# Patient Record
Sex: FEMALE
[De-identification: names, ages, dates, MRNs, and addresses within clinical notes are randomized per-mention and may not be internally consistent; named-entity substitution may affect disease eponyms.]

---

## 2023-01-23 ENCOUNTER — HOSPITAL ENCOUNTER (EMERGENCY)
Dept: HOSPITAL 97 - ER | Age: 23
Discharge: HOME | End: 2023-01-23
Payer: COMMERCIAL

## 2023-01-23 VITALS — OXYGEN SATURATION: 98 % | SYSTOLIC BLOOD PRESSURE: 121 MMHG | DIASTOLIC BLOOD PRESSURE: 62 MMHG

## 2023-01-23 VITALS — TEMPERATURE: 97.9 F

## 2023-01-23 DIAGNOSIS — L72.9: Primary | ICD-10-CM

## 2023-01-23 PROCEDURE — 10060 I&D ABSCESS SIMPLE/SINGLE: CPT

## 2023-01-23 PROCEDURE — 0H96XZZ DRAINAGE OF BACK SKIN, EXTERNAL APPROACH: ICD-10-PCS

## 2023-01-23 PROCEDURE — 99283 EMERGENCY DEPT VISIT LOW MDM: CPT

## 2023-01-23 NOTE — EDPHYS
Physician Documentation                                                                           

 [facility]                                                                 

Name: Tram Holden                                                                            

Age: 22 yrs                                                                                       

Sex: Female                                                                                       

: 2000                                                                                   

MRN: [id number]                                                                                   

Arrival Date: 2023                                                                          

Time: 14:52                                                                                       

Account#: [id number]                                                                            

Bed 17                                                                                            

Private MD:                                                                                       

ED Physician Wayne Lomas                                                                         

HPI:                                                                                              

                                                                                             

15:42 This 22 yrs old  Female presents to ER via Ambulatory with complaints of Bump   cp  

      On Back.                                                                                    

15:43 cyst. Description: fluctuant. Onset: The symptoms/episode began/occurred several months cp  

      ago. Associated signs and symptoms: Pertinent positives: swelling, pain, Pertinent          

      negatives: discharge, drainage, fever.                                                      

                                                                                                  

OB/GYN:                                                                                           

17:33 LMP 2023                                                                           [facility] 

                                                                                                  

Historical:                                                                                       

- Allergies:                                                                                      

15:33 No Known Allergies;                                                                     ss  

- Home Meds:                                                                                      

15:33 None [Active];                                                                          ss  

- PMHx:                                                                                           

15:33 None;                                                                                   ss  

- PSHx:                                                                                           

15:33 Tonsillectomy;  section; Cholecystectomy;                                       ss  

                                                                                                  

- Immunization history:: Client reports receiving the 2nd dose of the Covid vaccine.              

- Social history:: Smoking status: Patient denies any tobacco usage or history of.                

                                                                                                  

                                                                                                  

ROS:                                                                                              

15:44 Constitutional: Negative for body aches, chills, fever.                                 cp  

15:44 Cardiovascular: Negative for chest pain, palpitations.                                      

15:44 Respiratory: Negative for cough, shortness of breath, wheezing.                             

15:44 Abdomen/GI: Negative for abdominal pain, nausea, vomiting, and diarrhea.                    

15:44 Skin: Positive for of the upper mid back, cyst.                                             

15:44 All other systems are negative.                                                             

                                                                                                  

Exam:                                                                                             

15:44 Head/Face:  Normocephalic, atraumatic.                                                  cp  

15:44 Constitutional: The patient appears in no acute distress, alert, awake, non-toxic, well     

      developed, well nourished.                                                                  

15:44 Cardiovascular: Rate: normal.                                                               

15:44 Respiratory: the patient does not display signs of respiratory distress,  Respirations:     

      normal, no use of accessory muscles, no retractions, labored breathing, is not present.     

15:44 Skin: cyst noted upper mid back that appears w/o erythema, drainage, mildly tender to       

      palpation.                                                                                  

                                                                                                  

Vital Signs:                                                                                      

15:30  / 57; Pulse 71; Resp 15; Temp 97.9(TE); Pulse Ox 100% on R/A; Weight 65.32 kg;   ss  

      Height 5 ft. 5 in. (165.10 cm); Pain 6/10;                                                  

17:12  / 62; Pulse 80; Resp 16; Pulse Ox 98% ;                                          jh5 

15:30 Body Mass Index 23.96 (65.32 kg, 165.10 cm)                                             ss  

                                                                                                  

Procedures:                                                                                       

17:12 I \T\ D: Incision and drainage was performed for an abscess of the mid upper back Prepped cp

      with Betadine, Anesthetized with 5 ccs mixture 1% lidocaine w/o epi and 0.5% marcaine.      

      Incised with #11 blade. Drained thick white colored Packed with iodoform gauze,             

      Dressing: sterile 4x4 gauze, the patient tolerated the procedure well.                      

                                                                                                  

MDM:                                                                                              

16:05 Patient medically screened.                                                             cp  

16:45 Differential diagnosis: abscess, cellulitis, cyst.                                      cp  

17:15 Data reviewed: vital signs, nurses notes.                                               cp  

17:15 Consideration of Admission/Observation Escalation of care including                     cp  

      admission/observation considered. Test considered but Not performed: Other Details US.      

      Counseling: I had a detailed discussion with the patient and/or guardian regarding: the     

      historical points, exam findings, and any diagnostic results supporting the                 

      discharge/admit diagnosis, to return to the emergency department if symptoms worsen or      

      persist or if there are any questions or concerns that arise at home. Response to           

      treatment: the patient's symptoms have markedly improved after treatment, and as a          

      result, I will discharge patient.                                                           

                                                                                                  

                                                                                             

16:06 Order name: I\T\D Setup; Complete Time: 16:38                                             cp

                                                                                                  

Administered Medications:                                                                         

16:59 Drug: Lidocaine (1 %) 5 ml {Note: Given via Cristian Leo PA.} Volume: 5 ml; Route:        jh5 

      Infiltration;                                                                               

16:59 Drug: Marcaine (bupivacaine) (0.5 %) 5 ml {Note: Given Via Cristian Leo, PA.} Volume: 10   jh5 

      ml; Route: Infiltration;                                                                    

                                                                                                  

                                                                                                  

Disposition:                                                                                      

19:21 Co-signature as Attending Physician, Wayne ROUSSEAU was immediately available on-site ms3 

      in the Emergency Department for consultation in the care of the patient.                    

                                                                                                  

Disposition Summary:                                                                              

23 17:15                                                                                    

Discharge Ordered                                                                                 

      Location: Home                                                                          cp  

      Problem: new                                                                            cp  

      Symptoms: have improved                                                                 cp  

      Condition: Stable                                                                       cp  

      Diagnosis                                                                                   

        - Epidermal cyst - upper mid back                                                     cp  

      Followup:                                                                               cp  

        - With: Vimal Mcqueen MD                                                                

        - When: 1 - 2 days                                                                         

        - Reason: Wound Recheck                                                                    

      Discharge Instructions:                                                                     

        - Discharge Summary Sheet                                                             cp  

        - Epidermal Cyst Removal                                                              cp  

        - Epidermal Cyst                                                                      cp  

        - Epidermal Cyst Removal, Care After                                                  cp  

      Forms:                                                                                      

        - Medication Reconciliation Form                                                      cp  

        - Thank You Letter                                                                    cp  

        - Antibiotic Education                                                                cp  

        - Prescription Opioid Use                                                             cp  

        - Work release form                                                                   jh5 

      Prescriptions:                                                                              

        - Ibuprofen 800 mg Oral Tablet                                                             

            - take 1 tablet by ORAL route every 8 hours As needed take with food; 30 tablet;  cp  

      Refills: 0, Product Selection Permitted                                                     

Signatures:                                                                                       

Myrna Naranjo, RN                      RN   ss                                                   

Francesco Leo PA                         PA   Wayne Guerin DO DO   ms3                                                  

Cinthia Jarquin, RN                       RN   jh5                                                  

                                                                                                  

**************************************************************************************************

## 2023-01-23 NOTE — ER
Nurse's Notes                                                                                     

 [facility]                                                                 

Name: Tram Holden                                                                            

Age: 22 yrs                                                                                       

Sex: Female                                                                                       

: 2000                                                                                   

MRN: [id number]                                                                                   

Arrival Date: 2023                                                                          

Time: 14:52                                                                                       

Account#: [id number]                                                                            

Bed 17                                                                                            

Private MD:                                                                                       

Diagnosis: Epidermal cyst-upper mid back                                                          

                                                                                                  

Presentation:                                                                                     

                                                                                             

15:30 Chief complaint: Patient states: bump to middle upper back that was noticed 4 weeks ago ss  

      and seems to be getting bigger. Coronavirus screen: Client denies travel out of the         

      [place] in the last 14 days. Ebola Screen: Patient denies exposure to infectious person.       

      Patient denies travel to an Ebola-affected area in the 21 days before illness onset.        

      Initial Sepsis Screen: Does the patient meet any 2 criteria? No. Patient's initial          

      sepsis screen is negative. Does the patient have a suspected source of infection? No.       

      Patient's initial sepsis screen is negative. Risk Assessment: Do you want to hurt           

      yourself or someone else? Patient reports no desire to harm self or others. Onset of        

      symptoms was 2022.                                                                 

15:30 Method Of Arrival: Ambulatory                                                           ss  

15:30 Acuity: IVAN 4                                                                           ss  

                                                                                                  

Triage Assessment:                                                                                

17:33 General: Appears in no apparent distress. Behavior is calm, cooperative. Pain: Denies   [facility] 

      pain.                                                                                       

                                                                                                  

OB/GYN:                                                                                           

17:33 LMP 2023                                                                           [facility] 

                                                                                                  

Historical:                                                                                       

- Allergies:                                                                                      

15:33 No Known Allergies;                                                                     ss  

- Home Meds:                                                                                      

15:33 None [Active];                                                                          ss  

- PMHx:                                                                                           

15:33 None;                                                                                   ss  

- PSHx:                                                                                           

15:33 Tonsillectomy;  section; Cholecystectomy;                                       ss  

                                                                                                  

- Immunization history:: Client reports receiving the 2nd dose of the Covid vaccine.              

- Social history:: Smoking status: Patient denies any tobacco usage or history of.                

                                                                                                  

                                                                                                  

Screenin:12 [facility] ED Fall Risk Assessment (Adult) History of falling in the last 3 months,       [facility] 

      including since admission No falls in past 3 months (0 pts) Confusion or Disorientation     

      No (0 pts) Intoxicated or Sedated No (0 pts) Impaired Gait No (0 pts) Mobility Assist       

      Device Used No (0 pt) Altered Elimination No (0 pt) Score/Fall Risk Level 0 - 2 = Low       

      Risk. Abuse screen: Denies threats or abuse. Denies injuries from another. Nutritional      

      screening: No deficits noted. Tuberculosis screening: No symptoms or risk factors           

      identified.                                                                                 

                                                                                                  

Vital Signs:                                                                                      

15:30  / 57; Pulse 71; Resp 15; Temp 97.9(TE); Pulse Ox 100% on R/A; Weight 65.32 kg;     

      Height 5 ft. 5 in. (165.10 cm); Pain 6/10;                                                  

17:12  / 62; Pulse 80; Resp 16; Pulse Ox 98% ;                                          jh5 

15:30 Body Mass Index 23.96 (65.32 kg, 165.10 cm)                                               

                                                                                                  

ED Course:                                                                                        

14:52 Patient arrived in ED.                                                                  rg4 

14:58 Francesco Leo PA is PHCP.                                                                cp  

14:58 Wayne Lomas DO is Attending Physician.                                                cp  

15:29 Francesco Leo PA is PHCP.                                                                cp  

15:33 Triage completed.                                                                       ss  

15:33 Arm band placed on right wrist.                                                           

17:14 Vimal Mcqueen MD is Referral Physician.                                              cp  

17:33 Patient has correct armband on for positive identification. Bed in low position. Call   5 

      light in reach. Side rails up X 1.                                                          

17:33 No provider procedures requiring assistance completed. Patient did not have IV access   jh5 

      during this emergency room visit.                                                           

                                                                                                  

Administered Medications:                                                                         

16:59 Drug: Lidocaine (1 %) 5 ml {Note: Given via ART Stratton} Volume: 5 ml; Route:        jh5 

      Infiltration;                                                                               

16:59 Drug: Marcaine (bupivacaine) (0.5 %) 5 ml {Note: Given Via ART [facility]} Volume: 10   jh5 

      ml; Route: Infiltration;                                                                    

                                                                                                  

                                                                                                  

Medication:                                                                                       

17:33 VIS not applicable for this client.                                                     5 

                                                                                                  

Outcome:                                                                                          

17:15 Discharge ordered by MD.                                                                cp  

17:34 Discharged to home ambulatory.                                                          5 

17:34 Condition: good                                                                             

17:34 Discharge instructions given to patient, Instructed on discharge instructions, follow       

      up and referral plans. medication usage, safety practices, Demonstrated understanding       

      of instructions, follow-up care, medications, Prescriptions given X 1.                      

17:34 Patient left the ED.                                                                    5 

                                                                                                  

Signatures:                                                                                       

Myrna Naranjo RN                      RN                                                      

Francesco Leo PA PA cp Garcia, Rubi                                 rg4                                                  

Cinthai Jarquin RN                       RN   5                                                  

                                                                                                  

**************************************************************************************************

## 2023-01-23 NOTE — XMS REPORT
Continuity of Care Document

                           Created on:2023



Patient:CHINA BARRY

Sex:Female

:2000

External Reference #:[id number]





Demographics







                          Address                   [address]



                                                    [place] [address]

 

                          Home Phone                [phone]

 

                          Work Phone                [phone]

 

                          Mobile Phone              [phone]

 

                          Email Address             [email or web address]

 

                          Preferred Language        English

 

                          Marital Status            Unknown

 

                          Baptism Affiliation     Unknown

 

                          Race                      Unknown

 

                          Additional Race(s)        Unavailable



                                                    Unavailable

 

                          Ethnic Group              Unknown









Author







                          Organization              [facility]

t

 

                          Address                   [address]



                                                    [place] [address]

 

                          Phone                     [phone]









Support







                Name            Relationship    Address         Phone

 

                NALDO YADAV Significant     [address]        [phone]



                                                [place] [address] 

 

                NALDO YADAV Unavailable     [address]        [phone]



                                                [place] [address] 

 

                JHONNYKVNG               [address]     [phone]



                                                [place] [address] 

 

                RUI BARRY CLARK MCKEON               [address]     [place]

[place] [phone] 

 

                Rui Zayas          [address]     [phone]





                                                [place] [address] 









Care Team Providers







                    Name                Role                Phone

 

                    PCP, PATIENT DOES NOT HAVE A Primary Care Physician [place]

Mouna Olivarez     Attending Clinician Unavailable

 

                    Mireille Jackson MD     Attending Clinician [phone]

 

                    Unknown, Attending  Attending Clinician Unavailable

 

                    MIREILLE JACKSON        Attending Clinician Unavailable

 

                    Lucero Ryan  Attending Clinician [phone]

 

                    Provider, [facility] Attending Clinician Unavailable

 

                    Doctor Unassigned, No Name Attending Clinician Unavailable

 

                    Jaquan Land Attending Clinician [phone]

 

                    JAYNE Amaro Attending Clinician [phone]

 

                    JAYNE ROSS     Attending Clinician Unavailable

 

                    Nancy Gomez MD Attending Clinician [phone]

 

                    MATTHEW GOSS     Attending Clinician Unavailable

 

                    Emma Holt Attending Clinician [phone]

 

                    Pcp, Patient Does Not Have A Attending Clinician [phone]

[phone]

 

                    Mouna Muse     Admitting Clinician Unavailable

 

                    JAYNE ROSS     Admitting Clinician Unavailable

 

                    MATTHEW GOSS     Admitting Clinician Unavailable









Payers







           Payer Name Policy Type Policy Number Effective Date Expiration Date LIAM augustine

 

           [facility]            [id number] 2016 00:00:00            







Problems







       Condition Condition Condition Status Onset  Resolution Last   Treating Co

mments 

Source



       Name   Details Category        Date   Date   Treatment Clinician        



                                                 Date                 

 

       Flexural Flexural Disease Active                              Unive

rs



       eczema eczema               9-30                               ity of



                                   00:00:                             [place]



                                   00                                 Medical



                                                                      Branch

 

       Anxiety Anxiety Disease Active                              [facility]



                                   9-30                               ity of



                                   00:00:                             [place]



                                   00                                 Medical



                                                                      Branch

 

       Positive Positive Disease Active                              Unive

rs



       JULIAN    JULIAN                  2-20                               ity of



       (antinucle (antinucle               00:00:                             [facility]

[place]



       ar     ar                   00                                 Medical



       antibody) antibody)                                                  [facility]

ch

 

       H. pylori H. pylori Disease Active                       Overview: 

Univers



       infection infection               1-18                        Added  ity 

of



                                   00:00:                      automatic [place]



                                   00                          ally from Medical



                                                               request Branch



                                                               for    



                                                               surgery 



                                                               [id number] 

 

       Gastritis Gastritis Disease Active                       Overview: 

Univers



       without without               1-18                        Added  ity of



       bleeding, bleeding,               00:00:                      automatic [facility]

[place]



       unspecifie unspecifie               00                          ally from

 Medical



       d      d                                                request Branch



       chronicity chronicity                                           for    



       ,      ,                                                surgery 



       unspecifie unspecifie                                           [id number] 



       d      d                                                       



       gastritis gastritis                                                  



       type   type                                                    

 

       Gastritis, Gastritis, Disease Active                              U

nivers



       presence presence               1-08                               ity of



       of     of                   00:00:                             [place]



       bleeding bleeding               00                                 Medica

l



       unspecifie unspecifie                                                  Br

anch



       d,     d,                                                      



       unspecifie unspecifie                                                  



       d      d                                                       



       chronicity chronicity                                                  



       ,      ,                                                       



       unspecifie unspecifie                                                  



       d      d                                                       



       gastritis gastritis                                                  



       type   type                                                    

 

       Post-jasmina Post-jasmina Disease Active                              U

nivers



       cystectomy cystectomy               1-08                               it

y of



       syndrome syndrome               00:00:                             [place]



                                   00                                 Medical



                                                                      Branch

 

       Epigastric Epigastric Disease Active                              U

nivers



       abdominal abdominal               7-29                               ity 

of



       pain   pain                 00:00:                             [place]



                                   00                                 Medical



                                                                      Branch

 

       Migraines Migraines Disease Active                                    Uni

vers



                                                                      ity of



                                                                      [place]



                                                                      Medical



                                                                      Branch







Allergies, Adverse Reactions, Alerts







       Allergy Allergy Status Severity Reaction(s) Onset  Inactive Treating Comm

ents 

Source



       Name   Type                        Date   Date   Clinician        

 

       No Known DA     Active U                                   [facility]



       Allergie                             8                        [facility]



       [facility]                                  [facility]:                      [facility]



                                          00                          l of



                                                                      [place]

 

       No Known DA     Active U                                   [facility]



       Allergie                             8-31                        [facility]



       s                                  00:00:                      [facility]



                                          00                          l of



                                                                      [place]

 

       NO KNOWN Drug   Active                                           [facility]



       ALLERGIE Class                                                   ity of



       S                                                              [facility]







Social History







           Social Habit Start Date Stop Date  Quantity   Comments   Source

 

           ASSERTION  2021            Pregnant              University 



                      00:00:00                                    [facility]

 

           Exposure to 2022-11-10 2022 Not sure              [facility]-CoV-2 00:00:00   15:27:00                         [facility]



           (event)                                                [place]

 

           Tobacco use and 2022 Smokeless tobacco            Un

iversity of



           exposure   00:00:00   00:00:00   non-user              [facility]

 

           Alcohol intake 2022 Ex-drinker            [facility]



                      00:00:00   00:00:00   (finding)             [facility]

 

           Alcohol Comment 2017 Socially              Universit

y of



                      00:00:00   00:00:00                         [facility]

 

           Sex Assigned At [birth year]                       Universit

y of



           Birth      00:00:00   00:00:00                         [facility]









                Smoking Status  Start Date      Stop Date       Source

 

                Never smoked tobacco                                 [facility]







Medications







       Ordered Filled Start  Stop   Current Ordering Indication Dosage Frequency

 Signature

                    Comments            Components          Source



     Medication Medication Date Date Medication? Clinician                (SIG) 

          



     Name Name                                                   

 

     neomycin-po      2022      Yes       [id number] 4[drp]      Place 4        

   Univers



     lymyxin-hyd      1-20                               Drops in           ity 

of



     rocortisone      00:00:                               right ear           T

[place]



     otic      00                                 4 (four)           Medical



     solution                                         times           [place]



                                                  daily.           

 

     loratadine      2021- No        [id number] 10mg      Take 1           

Univers



     (CLARITIN)      6-14 07-15                          tablet by           ity

 of



     10 mg      00:00: 04:59                          mouth           [place]



     tablet      00   :00                           daily for           Medical



                                                  30 days.           Branch

 

     loratadine      2021- No        [id number] 10mg      Take 1           

Univers



     (CLARITIN)      6-14 07-15                          tablet by           ity

 of



     10 mg      00:00: 04:59                          mouth           [place]



     tablet      00   :00                           daily for           Medical



                                                  30 days.           Branch

 

     acetaminoph      2021- No             1000mg      1,000 mg,         

  Univers



     en        09 05-09                          Oral,           ity of



     (TYLENOL)      04:15: 03:21                          ONCE, 1           [place]

s



     tablet      00   :00                           dose, Sat           Medical



     1,000 mg                                         21 at           Branch



                                                  2315,           



                                                  Routine           

 

     cyclobenzap      2020-0      Yes       [id number] 10mg      Take 1          

 [facility]



     rine 10 mg      6-08                               tablet by           ity 

of



     tablet      00:00:                               mouth 3           [place]



               00                                 (three)           Medical



                                                  times           Branch



                                                  daily.           

 

     ibuprofen      2020-0      Yes       [id number] 600mg      Take 1           

Univers



     600 mg      6-08                               tablet by           ity of



     tablet      00:00:                               mouth           [place]



               00                                 every 6           Medical



                                                  (six)           Branch



                                                  hours as           



                                                  needed for           



                                                  Pain           



                                                  (scale           



                                                  4-6).           

 

     cyclobenzap      2020-0      Yes       [id number] 10mg      Take 1          

 Univers



     rine 10 mg      6-08                               tablet by           ity 

of



     tablet      00:00:                               mouth 3           [place]



               00                                 (three)           Medical



                                                  times           Branch



                                                  daily.           

 

     ibuprofen      2020-0      Yes       [id number] 600mg      Take 1           

Univers



     600 mg      6-08                               tablet by           ity of



     tablet      00:00:                               mouth           [place]



               00                                 every 6           Medical



                                                  (six)           Branch



                                                  hours as           



                                                  needed for           



                                                  Pain           



                                                  (scale           



                                                  4-6).           

 

     cyclobenzap      2020-0      Yes       [id number] 10mg      Take 1          

 Univers



     rine 10 mg      6-08                               tablet by           ity 

of



     tablet      00:00:                               mouth 3           [place]



               00                                 (three)           Medical



                                                  times           Branch



                                                  daily.           

 

     ibuprofen      2020-0      Yes       [id number] 600mg      Take 1           

Univers



     600 mg      6-08                               tablet by           ity of



     tablet      00:00:                               mouth           [place]



               00                                 every 6           Medical



                                                  (six)           Branch



                                                  hours as           



                                                  needed for           



                                                  Pain           



                                                  (scale           



                                                  4-6).           

 

     cyclobenzap      2020-0      Yes       [id number] 10mg      Take 1          

 Univers



     rine 10 mg      6-08                               tablet by           ity 

of



     tablet      00:00:                               mouth 3           [place]



               00                                 (three)           Medical



                                                  times           Branch



                                                  daily.           

 

     ibuprofen      2020-0      Yes       [id number] 600mg      Take 1           

Univers



     600 mg      6-08                               tablet by           ity of



     tablet      00:00:                               mouth           [place]



               00                                 every 6           Medical



                                                  (six)           Branch



                                                  hours as           



                                                  needed for           



                                                  Pain           



                                                  (scale           



                                                  4-6).           

 

     cyclobenzap      2020-0      Yes       [id number] 10mg      Take 1          

 Univers



     rine 10 mg      6-08                               tablet by           ity 

of



     tablet      00:00:                               mouth 3           [place]



               00                                 (three)           Medical



                                                  times           Branch



                                                  daily.           

 

     ibuprofen      2020-0      Yes       [id number] 600mg      Take 1           

Univers



     600 mg      6-08                               tablet by           ity of



     tablet      00:00:                               mouth           [place]



               00                                 every 6           Medical



                                                  (six)           Branch



                                                  hours as           



                                                  needed for           



                                                  Pain           



                                                  (scale           



                                                  4-6).           

 

     cyclobenzap      2020-0      Yes       [id number] 10mg      Take 1          

 Univers



     rine 10 mg      6-08                               tablet by           ity 

of



     tablet      00:00:                               mouth 3           [place]



               00                                 (three)           Medical



                                                  times           Branch



                                                  daily.           

 

     ibuprofen      2020-0      Yes       [id number] 600mg      Take 1           

Univers



     600 mg      6-08                               tablet by           ity of



     tablet      00:00:                               mouth           [place]



               00                                 every 6           Medical



                                                  (six)           Branch



                                                  hours as           



                                                  needed for           



                                                  Pain           



                                                  (scale           



                                                  4-6).           

 

     cyclobenzap      2020-0      Yes       [id number] 10mg      Take 1          

 Univers



     rine 10 mg      6-08                               tablet by           ity 

of



     tablet      00:00:                               mouth 3           [place]



               00                                 (three)           Medical



                                                  times           Branch



                                                  daily.           

 

     ibuprofen      2020-0      Yes       [id number] 600mg      Take 1           

Univers



     600 mg      6-08                               tablet by           ity of



     tablet      00:00:                               mouth           [place]



               00                                 every 6           Medical



                                                  (six)           Branch



                                                  hours as           



                                                  needed for           



                                                  Pain           



                                                  (scale           



                                                  4-6).           

 

     ibuprofen      2020-0 2020- No        [id number] 600mg      Take 1          

 Univers



     600 mg      6-08 06-08                          tablet by           ity of



     tablet      00:00: 00:00                          mouth           [place]



               00   :00                           every 6           Medical



                                                  (six)           Branch



                                                  hours as           



                                                  needed for           



                                                  Pain           



                                                  (scale           



                                                  4-6).           

 

     ibuprofen      2020-0 2020- No        [id number] 600mg      Take 1          

 Univers



     600 mg      6-08 06-08                          tablet by           ity of



     tablet      00:00: 00:00                          mouth           [place]



               00   :00                           every 6           Medical



                                                  (six)           Branch



                                                  hours as           



                                                  needed for           



                                                  Pain           



                                                  (scale           



                                                  4-6).           

 

     traMADol      2019      Yes       [id number] 50mg      Take 1           [facility]

[facility]



     (ULTRAM) 50      0-22                               tablet by           [facility]

 of



     mg tablet      00:00:                               mouth           [place]



               00                                 every 6           Medical



                                                  (six)           Branch



                                                  hours as           



                                                  needed for           



                                                  Pain           



                                                  (scale           



                                                  7-10).           

 

     traMADol      2019      Yes       [id number] 50mg      Take 1           [facility]

[facility]



     (ULTRAM) 50      0-22                               tablet by           [facility]

 of



     mg tablet      00:00:                               mouth           [place]



               00                                 every 6           Medical



                                                  (six)           Branch



                                                  hours as           



                                                  needed for           



                                                  Pain           



                                                  (scale           



                                                  7-10).           

 

     traMADol      2019      Yes       [id number] 50mg      Take 1           [facility]

[facility]



     (ULTRAM) 50      0-22                               tablet by           [facility]

 of



     mg tablet      00:00:                               mouth           [place]



               00                                 every 6           Medical



                                                  (six)           Branch



                                                  hours as           



                                                  needed for           



                                                  Pain           



                                                  (scale           



                                                  7-10).           

 

     traMADol      2019      Yes       [id number] 50mg      Take 1           [facility]

[facility]



     (ULTRAM) 50      0-22                               tablet by           [facility]

 of



     mg tablet      00:00:                               mouth           [place]



               00                                 every 6           Medical



                                                  (six)           Branch



                                                  hours as           



                                                  needed for           



                                                  Pain           



                                                  (scale           



                                                  7-10).           

 

     traMADol      2019      Yes       [id number] 50mg      Take 1           [facility]

[facility]



     (ULTRAM) 50      0-22                               tablet by           [facility]

 of



     mg tablet      00:00:                               mouth           [place]



               00                                 every 6           Medical



                                                  (six)           Branch



                                                  hours as           



                                                  needed for           



                                                  Pain           



                                                  (scale           



                                                  7-10).           

 

     traMADol      2019      Yes       [id number] 50mg      Take 1           [facility]

[facility]



     (ULTRAM) 50      0-22                               tablet by           [facility]

 of



     mg tablet      00:00:                               mouth           [place]



               00                                 every 6           Medical



                                                  (six)           Branch



                                                  hours as           



                                                  needed for           



                                                  Pain           



                                                  (scale           



                                                  7-10).           

 

     traMADol      2019      Yes       [id number] 50mg      Take 1           [facility]

[facility]



     (ULTRAM) 50      0-22                               tablet by           [facility]

 of



     mg tablet      00:00:                               mouth           [place]



               00                                 every 6           Medical



                                                  (six)           Branch



                                                  hours as           



                                                  needed for           



                                                  Pain           



                                                  (scale           



                                                  7-10).           

 

     traMADol      2019      Yes       [id number] 50mg      Take 1           [facility]

[facility]



     (ULTRAM) 50      0-22                               tablet by           [facility]

 of



     mg tablet      00:00:                               mouth           [place]



               00                                 every 6           Medical



                                                  (six)           Branch



                                                  hours as           



                                                  needed for           



                                                  Pain           



                                                  (scale           



                                                  7-10).           

 

     ondansetron      2019      Yes       [id number] 4mg       Take 1           

[facility]



     (ZOFRAN      0-08                               tablet by           [facility] of



     ODT) 4 mg      00:00:                               mouth           [place]



     disintegrat      00                                 every 8           Medic

al



     ing tablet                                         (eight)           Branch



                                                  hours as           



                                                  needed for           



                                                  Nausea and           



                                                  Vomiting           



                                                  (N/V).           

 

     ibuprofen      2019      Yes       [id number] 600mg      Take 1           [facility]

[facility]



     600 mg      0-08                               tablet by           [facility] of



     tablet      00:00:                               mouth           [place]



               00                                 every 8           Medical



                                                  (eight)           Branch



                                                  hours as           



                                                  needed for           



                                                  Pain           



                                                  (scale           



                                                  4-6).           

 

     ondansetron      2019      Yes       [id number] 4mg       Take 1           

[facility]



     (ZOFRAN      0-08                               tablet by           [facility] of



     ODT) 4 mg      00:00:                               mouth           [place]



     disintegrat      00                                 every 8           Medic

al



     ing tablet                                         (eight)           Branch



                                                  hours as           



                                                  needed for           



                                                  Nausea and           



                                                  Vomiting           



                                                  (N/V).           

 

     ibuprofen      2019      Yes       [id number] 600mg      Take 1           [facility]

[facility]



     600 mg      0-08                               tablet by           [facility] of



     tablet      00:00:                               mouth           [place]



               00                                 every 8           Medical



                                                  (eight)           Branch



                                                  hours as           



                                                  needed for           



                                                  Pain           



                                                  (scale           



                                                  4-6).           

 

     ondansetron      2019      Yes       [id number] 4mg       Take 1           

[facility]



     (ZOFRAN      0-08                               tablet by           [facility] of



     ODT) 4 mg      00:00:                               mouth           [place]



     disintegrat      00                                 every 8           Medic

al



     ing tablet                                         (eight)           Branch



                                                  hours as           



                                                  needed for           



                                                  Nausea and           



                                                  Vomiting           



                                                  (N/V).           

 

     ibuprofen      2019      Yes       [id number] 600mg      Take 1           [facility]

[facility]



     600 mg      0-08                               tablet by           [facility] of



     tablet      00:00:                               mouth           [place]



               00                                 every 8           Medical



                                                  (eight)           Branch



                                                  hours as           



                                                  needed for           



                                                  Pain           



                                                  (scale           



                                                  4-6).           

 

     ondansetron      2019      Yes       [id number] 4mg       Take 1           

[facility]



     (ZOFRAN      0-08                               tablet by           [facility] of



     ODT) 4 mg      00:00:                               mouth           [place]



     disintegrat      00                                 every 8           Medic

al



     ing tablet                                         (eight)           Branch



                                                  hours as           



                                                  needed for           



                                                  Nausea and           



                                                  Vomiting           



                                                  (N/V).           

 

     ibuprofen      2019      Yes       [id number] 600mg      Take 1           [facility]

[facility]



     600 mg      0-08                               tablet by           [facility] of



     tablet      00:00:                               mouth           [place]



               00                                 every 8           Medical



                                                  (eight)           Branch



                                                  hours as           



                                                  needed for           



                                                  Pain           



                                                  (scale           



                                                  4-6).           

 

     ondansetron      2019      Yes       [id number] 4mg       Take 1           

[facility]



     (ZOFRAN      0-08                               tablet by           [facility] of



     ODT) 4 mg      00:00:                               mouth           [place]



     disintegrat      00                                 every 8           Medic

al



     ing tablet                                         (eight)           Branch



                                                  hours as           



                                                  needed for           



                                                  Nausea and           



                                                  Vomiting           



                                                  (N/V).           

 

     ibuprofen      2019      Yes       [id number] 600mg      Take 1           [facility]

[facility]



     600 mg      0-08                               tablet by           [facility] of



     tablet      00:00:                               mouth           [place]



               00                                 every 8           Medical



                                                  (eight)           Branch



                                                  hours as           



                                                  needed for           



                                                  Pain           



                                                  (scale           



                                                  4-6).           

 

     ondansetron      2019      Yes       [id number] 4mg       Take 1           

[facility]



     (ZOFRAN      0-08                               tablet by           [facility] of



     ODT) 4 mg      00:00:                               mouth           [place]



     disintegrat      00                                 every 8           Medic

al



     ing tablet                                         (eight)           [facility]anch



                                                  hours as           



                                                  needed for           



                                                  Nausea and           



                                                  Vomiting           



                                                  (N/V).           

 

     ibuprofen      2019      Yes       [id number] 600mg      Take 1           [facility]

[facility]



     600 mg      0-08                               tablet by           [facility] of



     tablet      00:00:                               mouth           [place]



               00                                 every 8           Medical



                                                  (eight)           Branch



                                                  hours as           



                                                  needed for           



                                                  Pain           



                                                  (scale           



                                                  4-6).           

 

     ondansetron      2019      Yes       [id number] 4mg       Take 1           

[facility]



     (ZOFRAN      0-08                               tablet by           [facility] of



     ODT) 4 mg      00:00:                               mouth           [place]



     disintegrat      00                                 every 8           Medic

al



     ing tablet                                         (eight)           [facility]



                                                  hours as           



                                                  needed for           



                                                  Nausea and           



                                                  Vomiting           



                                                  (N/V).           

 

     ibuprofen      2019      Yes       [id number] 600mg      Take 1           [facility]

[facility]



     600 mg      0-08                               tablet by           [facility] of



     tablet      00:00:                               mouth           [place]



               00                                 every 8           Medical



                                                  (eight)           Branch



                                                  hours as           



                                                  needed for           



                                                  Pain           



                                                  (scale           



                                                  4-6).           

 

     ondansetron      2019      Yes       [id number] 4mg       Take 1           

[facility]



     (ZOFRAN      0-08                               tablet by           [facility] of



     ODT) 4 mg      00:00:                               mouth           [place]



     disintegrat      00                                 every 8           Medic

al



     ing tablet                                         (eight)           Branch



                                                  hours as           



                                                  needed for           



                                                  Nausea and           



                                                  Vomiting           



                                                  (N/V).           

 

     ibuprofen      2019      Yes       [id number] 600mg      Take 1           [facility]

[facility]



     600 mg      0-08                               tablet by           [facility] of



     tablet      00:00:                               mouth           [place]



               00                                 every 8           Medical



                                                  (eight)           Branch



                                                  hours as           



                                                  needed for           



                                                  Pain           



                                                  (scale           



                                                  4-6).           

 

     SERTraline            Yes       [id number] 25mg      Take 1           [facility]

[facility]



     25 mg      9-30                               tablet by           [facility] of



     tablet      00:00:                               mouth           [place]



               00                                 every           Medical



                                                  morning.           Branch

 

     mometasone      0      Yes       [id number]           Apply to          

 [facility]



     0.1 % cream      9-30                               area(s)           ity o

f



               00:00:                               daily.           [place]



                                                               [facility]

 

     SERTraline      0      Yes       [id number] 25mg      Take 1           [facility]

n[facility]



     25 mg      9-30                               tablet by           ity of



     tablet      00:00:                               mouth           [place]



               00                                 every           Medical



                                                  morning.           Branch

 

     mometasone      2019-0      Yes       [id number]           Apply to          

 [facility]



     0.1 % cream      9-30                               area(s)           ity o

f



               00:00:                               daily.           [place]



                                                               Medical



                                                                 Branch

 

     SERTraline      2019-0      Yes       [id number] 25mg      Take 1           [facility]

nivers



     25 mg      9-30                               tablet by           ity of



     tablet      00:00:                               mouth           [place]



               00                                 every           Medical



                                                  morning.           Branch

 

     mometasone      2019-0      Yes       [id number]           Apply to          

 [facility]



     0.1 % cream      9-30                               area(s)           ity o

f



               00:00:                               daily.           [place]



                                                               [facility]

 

     SERTraline      -0      Yes       [id number] 25mg      Take 1           [facility]

nivers



     25 mg      9-30                               tablet by           ity of



     tablet      00:00:                               mouth           [place]



               00                                 every           Medical



                                                  morning.           Branch

 

     mometasone      2019-0      Yes       [id number]           Apply to          

 [facility]



     0.1 % cream      9-30                               area(s)           ity o

f



               00:00:                               daily.           [address]

 

     SERTraline            Yes       [id number] 25mg      Take 1           U

nivers



     25 mg      9-30                               tablet by           ity of



     tablet      00:00:                               mouth           [place]



               00                                 every           Medical



                                                  morning.           [place]

 

     mometasone            Yes       [id number]           Apply to          

 [facility]



     0.1 % cream      9-30                               area(s)           ity o

f



               00:00:                               daily.           [address]

 

     SERTraline            Yes       [id number] 25mg      Take 1           U

nivers



     25 mg      9-30                               tablet by           ity of



     tablet      00:00:                               mouth           [place]



               00                                 every           Medical



                                                  morning.           [place]

 

     mometasone            Yes       [id number]           Apply to          

 [facility]



     0.1 % cream      9-30                               area(s)           ity o

f



               00:00:                               daily.           [address]

 

     SERTraline            Yes       [id number] 25mg      Take 1           U

nivers



     25 mg      9-30                               tablet by           ity of



     tablet      00:00:                               mouth           [place]



               00                                 every           Medical



                                                  morning.           [place]

 

     mometasone            Yes       [id number]           Apply to          

 [facility]



     0.1 % cream      9-30                               area(s)           ity o

f



               00:00:                               daily.           [address]

 

     SERTraline            Yes       [id number] 25mg      Take 1           U

nivers



     25 mg      9-30                               tablet by           ity of



     tablet      00:00:                               mouth           [place]



               00                                 every           Medical



                                                  morning.           [place]

 

     mometasone            Yes       [id number]           Apply to          

 [facility]



     0.1 % cream      9-30                               area(s)           ity o

f



               00:00:                               daily.           [address]

 

     cefTRIAXone       2019- No        [id number] 1000mg                    

 [facility]



     (ROCEPHIN)                                               ity of



     injection      15:15: 14:30                                         [place]



     1,000 mg      00   :00                                          [facility]

 

     cefTRIAXone       2019- No        [id number] 1000mg      1,000 mg,     

      [facility]



     (ROCEPHIN)                                Intramuscu           it

y of



     injection      15:15: 14:30                          lar, ONCE,           [place]

[place]



     1,000 mg      00   :00                           1 dose,           Medical



                                                  Mon 19           Branch



                                                  at 1015,           



                                                  ASAP<br>Re           



                                                  ason for           



                                                  Anti-Infec           



                                                  tive:           



                                                  Documented           



                                                  Infection<           



                                                  br>Documen           



                                                  cristian            



                                                  Infection           



                                                  Site:           



                                                  Respirator           



                                                  [email or web address]           



                                                  ion of           



                                                  Therapy: 7           



                                                  days           

 

     cefTRIAXone       2019- No        [id number] 1000mg                    

 [facility]



     (ROCEPHIN)                                               ity of



     injection      15:15: 14:30                                         [place]



     1,000 mg      00   :00                                          Medical



                                                                 Branch

 

     cefTRIAXone       2019- No        [id number] 1000mg      1,000 mg,     

      [facility]



     (ROCEPHIN)                                Intramuscu           it

y of



     injection      15:15: 14:30                          lar, ONCE,           T

[place]



     1,000 mg      00   :00                           1 dose,           Medical



                                                  Mon 19           Branch



                                                  at 1015,           



                                                  ASAP<br>Re           



                                                  ason for           



                                                  Anti-Infec           



                                                  tive:           



                                                  Documented           



                                                  Infection<           



                                                  br>Documen           



                                                  cristian            



                                                  Infection           



                                                  Site:           



                                                  Respirator           



                                                  [email or web address]>[email or web address]           



                                                  ion of           



                                                  Therapy: 7           



                                                  days           

 

     amoxicillin       2019- No        [id number] 500mg      Take 1         

  [facility]



     500 mg                                capsule by           ity of



     capsule      00:00: 04:59                          mouth 2           [place]



               00   :00                           (two)           Medical



                                                  times           [facility]



                                                  daily for           



                                                  10 days.           

 

     amoxicillin       2019- No        [id number] 500mg      Take 1         

  [facility]



     500 mg                                capsule by           ity of



     capsule      00:00: 04:59                          mouth 2           [place]



               00   :00                           (two)           Medical



                                                  times           Branch



                                                  daily for           



                                                  10 days.           

 

     benzonatate            Yes       [id number] 100mg      Take 1          

 [facility]



     (TESSALON      5-07                               capsule by           ity 

of



     PERLES) 100      00:00:                               mouth 3           [place]

as



     mg capsule      00                                 (three)           Medica

l



                                                  times           Branch



                                                  daily.           

 

     benzonatate       2019- No        [id number] 100mg      Take 1         

  [facility]



     (TESSALON      5-07 -                          capsule by           [place]

 



     PERLES) 100      00:00: 00:00                          mouth 3           [place]

[place]



     mg capsule      00   :00                           (three)           Medica

l



                                                  times           Branch



                                                  daily.           

 

     No known                No                                      [facility]



     medications                                                        ity of



                                                                 [facility]







Immunizations







           Ordered Immunization Filled Immunization Date       Status     Commen

ts   Source



           Name       Name                                        

 

           Meningococcal            2017 Completed             [facility] 

of



           Polysaccharide            00:00:00                         [place] [facility]

[facility]



           (groups A, C, Y and                                             Branc

h



           W-135) conjugate                                             



           vaccine (MCV4P)                                             

 

           Meningococcal            2017 Completed             [facility] 

of



           Polysaccharide            00:00:00                         [place] [facility]

[facility]



           (groups A, C, Y and                                             Branc

h



           W-135) conjugate                                             



           vaccine (MCV4P)                                             

 

           Meningococcal            2017 Completed             [facility] 

of



           Polysaccharide            00:00:00                         [place] [facility]

[facility]



           (groups A, C, Y and                                             Branc

h



           W-135) conjugate                                             



           vaccine (MCV4P)                                             

 

           Meningococcal            2017 Completed             [facility] 

of



           Polysaccharide            00:00:00                         [place] [facility]

[facility]



           (groups A, C, Y and                                             Branc

h



           W-135) conjugate                                             



           vaccine (MCV4P)                                             

 

           Meningococcal            2017 Completed             [facility] 

of



           Polysaccharide            00:00:00                         [place] [facility]

[facility]



           (groups A, C, Y and                                             Branc

h



           W-135) conjugate                                             



           vaccine (MCV4P)                                             

 

           Meningococcal            2017 Completed             [facility] 

of



           Polysaccharide            00:00:00                         [place] [facility]

[facility]



           (groups A, C, Y and                                             Branc

h



           W-135) conjugate                                             



           vaccine (MCV4P)                                             

 

           Meningococcal            2017 Completed             [facility] 

of



           Polysaccharide            00:00:00                         [place] [facility]

[facility]



           (groups A, C, Y and                                             Branc

h



           W-135) conjugate                                             



           vaccine (MCV4P)                                             

 

           Meningococcal            2017 Completed             [facility] 

of



           Polysaccharide            00:00:00                         [place] [facility]

[facility]



           (groups A, C, Y and                                             Branc

h



           W-135) conjugate                                             



           vaccine (MCV4P)                                             

 

           Meningococcal            2017 Completed             [facility] 

of



           Polysaccharide            00:00:00                         [place] [facility]

[facility]



           (groups A, C, Y and                                             Branc

h



           W-135) conjugate                                             



           vaccine (MCV4P)                                             

 

           Meningococcal            2017 Completed             [facility] 

of



           Polysaccharide            00:00:00                         [place] [facility]

[facility]



           (groups A, C, Y and                                             Branc

h



           W-135) conjugate                                             



           vaccine (MCV4P)                                             

 

           Meningococcal            2017 Completed             [facility] 

of



           Polysaccharide            00:00:00                         [place] [facility]

[facility]



           (groups A, C, Y and                                             Branc

h



           W-135) conjugate                                             



           vaccine (MCV4P)                                             

 

           Meningococcal            2017 Completed             [facility] 

of



           Polysaccharide            00:00:00                         [place] [facility]

[facility]



           (groups A, C, Y and                                             Branc

h



           W-135) conjugate                                             



           vaccine (MCV4P)                                             

 

           Meningococcal            2017 Completed             [facility] 

of



           Polysaccharide            00:00:00                         [place] [facility]

[facility]



           (groups A, C, Y and                                             Branc

h



           W-135) conjugate                                             



           vaccine (MCV4P)                                             

 

           Meningococcal            2017 Completed             [facility] 

of



           Polysaccharide            00:00:00                         [place] [facility]

[facility]



           (groups A, C, Y and                                             Branc

h



           W-135) conjugate                                             



           vaccine (MCV4P)                                             

 

           HPV                   2012 Completed             [facility]



                                 00:00:00                         [facility]

 

           Influenza Virus            2012 Completed             Universit

y of



           Vaccine - Whole            00:00:00                         [facility]

 

           HPV                   2012 Completed             [facility]



                                 00:00:00                         [facility]

 

           Influenza Virus            2012 Completed             Universit

y of



           Vaccine - Whole            00:00:00                         [facility]

 

           HPV                   2012 Completed             [facility]



                                 00:00:00                         [facility]

 

           Influenza Virus            2012 Completed             Universit

y of



           Vaccine - Whole            00:00:00                         [facility]

 

           HPV                   2012 Completed             [facility]



                                 00:00:00                         [facility]

 

           Influenza Virus            2012 Completed             Universit

y of



           Vaccine - Whole            00:00:00                         [facility]

 

           HPV                   2012 Completed             [facility]



                                 00:00:00                         [facility]

 

           Influenza Virus            2012 Completed             Universit

y of



           Vaccine - Whole            00:00:00                         [facility]

 

           HPV                   2012 Completed             [facility]



                                 00:00:00                         [facility]

 

           Influenza Virus            2012 Completed             Universit

y of



           Vaccine - Whole            00:00:00                         [facility]

 

           HPV                   2012 Completed             [facility]



                                 00:00:00                         [facility]

 

           Influenza Virus            2012 Completed             Universit

y of



           Vaccine - Whole            00:00:00                         [facility]

 

           HPV                   2012 Completed             [facility]



                                 00:00:00                         [facility]

 

           Influenza Virus            2012 Completed             Universit

y of



           Vaccine - Whole            00:00:00                         [facility]

 

           HPV                   2012 Completed             [facility]



                                 00:00:00                         [facility]

 

           Influenza Virus            2012 Completed             Universit

y of



           Vaccine - Whole            00:00:00                         [facility]

 

           HPV                   2012 Completed             [facility]



                                 00:00:00                         [facility]

 

           Influenza Virus            2012 Completed             Universit

y of



           Vaccine - Whole            00:00:00                         [facility]

 

           HPV                   2012 Completed             [facility]



                                 00:00:00                         [facility]

 

           Influenza Virus            2012 Completed             Universit

y of



           Vaccine - Whole            00:00:00                         [facility]

 

           HPV                   2012 Completed             [facility]



                                 00:00:00                         [facility]

 

           Influenza Virus            2012 Completed             Universit

y of



           Vaccine - Whole            00:00:00                         [facility]

 

           HPV                   2012 Completed             [facility]



                                 00:00:00                         [facility]

 

           Influenza Virus            2012 Completed             Universit

y of



           Vaccine - Whole            00:00:00                         [place] [facility]

ical



                                                                  [facility]

 

           HPV                   2012 Completed             [facility]



                                 00:00:00                         [facility]



                                                                  Branch

 

           HPV                   2012 Completed             [facility]



                                 00:00:00                         [facility]



                                                                  Branch

 

           HPV                   2012 Completed             [facility]



                                 00:00:00                         [facility]



                                                                  Branch

 

           HPV                   2012 Completed             [facility]



                                 00:00:00                         [facility]



                                                                  Branch

 

           HPV                   2012 Completed             [facility]



                                 00:00:00                         [facility]



                                                                  Branch

 

           HPV                   2012 Completed             [facility]



                                 00:00:00                         [facility]



                                                                  Branch

 

           HPV                   2012 Completed             [facility]



                                 00:00:00                         [facility]



                                                                  Branch

 

           HPV                   2012 Completed             [facility]



                                 00:00:00                         [facility]



                                                                  Branch

 

           HPV                   2012 Completed             [facility]



                                 00:00:00                         [facility]



                                                                  Branch

 

           HPV                   2012 Completed             [facility]



                                 00:00:00                         [facility]



                                                                  Branch

 

           HPV                   2012 Completed             [facility]



                                 00:00:00                         [facility]

 

           HPV                   2012 Completed             [facility]



                                 00:00:00                         [facility]

 

           HPV                   2012 Completed             [facility]



                                 00:00:00                         [facility]







Vital Signs







             Vital Name   Observation Time Observation Value Comments     Source

 

             Systolic blood 2022 21:27:00 131 mm[Hg]                [facility]

sity of



             pressure                                            [facility]

 

             Diastolic blood 2022 21:27:00 85 mm[Hg]                 Unive

rsity of



             pressure                                            [facility]

 

             Heart rate   2022 21:27:00 78 /min                   [facility]

 

             Body temperature 2022 21:27:00 37.11 Claudia                 [facility]

 

             Res[facility] rate 2022 21:27:00 18 /min                   [facility]

 

             Body height  2022 21:27:00 165.1 cm                  [facility]

 

             Body weight  2022 21:27:00 65.182 kg                 [facility]

 

             BMI          2022 21:27:00 23.91 kg/m2               [facility]

 

             Oxygen saturation in 2022 21:27:00 100 /min                  

[facility]



             Arterial blood by                                        [place] [facility]

[facility]



             Pulse oximetry                                        [facility]

 

             Systolic blood 2021 16:34:00 115 mm[Hg]                [facility]

sity of



             pressure                                            [place] [facility]



                                                                 [facility]

 

             Diastolic blood 2021 16:34:00 68 mm[Hg]                 Unive

rsity of



             pressure                                            [place] [facility]



                                                                 [facility]

 

             Heart rate   2021 16:34:00 93 /min                   [facility]

ty of



                                                                 [place] [facility]



                                                                 [facility]

 

             Body temperature 2021 16:34:00 36.94 Claudia                 Univ

ersity of



                                                                 [place] [facility]



                                                                 [facility]

 

             Respiratory rate 2021 16:34:00 12 /min                   Univ

ersity of



                                                                 [place] [facility]



                                                                 [facility]

 

             Body height  2021 16:34:00 165.1 cm                  [facility]

ty of



                                                                 [place] [facility]



                                                                 [facility]

 

             Body weight  2021 16:34:00 68.04 kg                  [facility]

ty of



                                                                 [place] [facility]



                                                                 [facility]

 

             BMI          2021 16:34:00 24.96 kg/m2               [facility]

ty of



                                                                 [place] [facility]



                                                                 [facility]

 

             Oxygen saturation in 2021 16:34:00 97 /min                   

[facility]



             Arterial blood by                                        [place] [facility]

[facility]



             Pulse oximetry                                        [facility]

 

             Systolic blood 2021 02:17:00 118 mm[Hg]                Univer

sity of



             pressure                                            [place] [facility]



                                                                 [facility]

 

             Diastolic blood 2021 02:17:00 76 mm[Hg]                 Unive

rsity of



             pressure                                            [facility]



                                                                 Branch

 

             Heart rate   2021 02:17:00 87 /min                   Universi

ty of



                                                                 [facility]



                                                                 Branch

 

             Body temperature 2021 02:17:00 37.11 Claudia                 Univ

ersity of



                                                                 [facility]



                                                                 Branch

 

             Respiratory rate 2021 02:17:00 20 /min                   Univ

ersity of



                                                                 [facility]



                                                                 [facility]anch

 

             Body height  2021 02:17:00 165.1 cm                  Universi

ty of



                                                                 [facility]



                                                                 Branch

 

             Body weight  2021 02:17:00 60.782 kg                 Universi

ty of



                                                                 [facility]



                                                                 Branch

 

             BMI          2021 02:17:00 22.30 kg/m2               Universi

ty of



                                                                 [facility]



                                                                 Branch

 

             Oxygen saturation in 2021 02:17:00 94 /min                   

University [facility]



             Arterial blood by                                        [facility]

[facility]



             Pulse oximetry                                        Branch

 

             Systolic blood 2020 19:51:00 122 mm[Hg]                Univer

sity of



             pressure                                            [facility]



                                                                 Branch

 

             Diastolic blood 2020 19:51:00 82 mm[Hg]                 Unive

rsity of



             pressure                                            [facility]



                                                                 [facility]anch

 

             Heart rate   2020 19:51:00 88 /min                   Universi

ty of



                                                                 [facility]



                                                                 Branch

 

             Body temperature 2020 19:51:00 37.06 Claudia                 Univ

ersity of



                                                                 [facility]



                                                                 Branch

 

             Respiratory rate 2020 19:51:00 20 /min                   Univ

ersity of



                                                                 [facility]



                                                                 [facility]anch

 

             Body weight  2020 19:51:00 57.607 kg                 Universi

ty of



                                                                 [facility]

 

             Oxygen saturation in 2020 19:51:00 100 /min                  

[facility]



             Arterial blood by                                        [place] [facility]

[facility]



             Pulse oximetry                                        Branch

 

             Systolic blood 2019 13:56:00 102 mm[Hg]                Univer

sity of



             pressure                                            [facility]



                                                                 Branch

 

             Diastolic blood 2019 13:56:00 69 mm[Hg]                 Unive

rsity of



             pressure                                            [facility]



                                                                 Branch

 

             Heart rate   2019 13:56:00 81 /min                   Universi

ty of



                                                                 [facility]

 

             Body temperature 2019 13:56:00 37 Claudia                    Univ

ersity of



                                                                 [facility]



                                                                 [place]

 

             Body weight  2019 13:56:00 55.792 kg                 Universi

ty of



                                                                 [facility]

 

             Oxygen saturation in 2019 13:56:00 98 /min                   

[facility]



             Arterial blood by                                        [place] [facility]

[facility]



             Pulse oximetry                                        Branch

 

             Systolic blood 2019 14:06:00 103 mm[Hg]                Univer

sity of



             pressure                                            [facility]



                                                                 Branch

 

             Diastolic blood 2019 14:06:00 68 mm[Hg]                 Unive

rsity of



             pressure                                            [facility]

 

             Heart rate   2019 14:06:00 94 /min                   Universi

ty of



                                                                 [facility]



                                                                 [place]

 

             Body temperature 2019 14:06:00 39 Claudia                    Univ

ersity of



                                                                 [facility]

 

             Body weight  2019 14:06:00 59.421 kg                 Universi

ty of



                                                                 [facility]

 

             Oxygen saturation in 2019 14:06:00 99 /min                   

University [facility]



             Arterial blood by                                        [facility]

[facility]



             Pulse oximetry                                        Branch







Procedures







                Procedure       Date / Time     Performing Clinician Source



                                Performed                       

 

                [id number]         2021-10-02 00:00:00 KARMA.04        [facility]

 

                [id number]         2021-10-02 00:00:00 KARMA.04        [facility]

 

                ASSIGNMENT OF BENEFITS 2021 16:25:40 Doctor Unassigned, Un

[facility]



                                                [place]         [facility]

 

                CONSENT/REFUSAL FOR 2021 02:06:25 Doctor Unassigned, [facility]

[facility] [place]



                DIAGNOSIS AND TREATMENT                 [place]         [facility] 

Branch

 

                XR LUMBAR SPINE 3 VW 2020 21:17:16 JAYNE Ross [facility]



                                                                Medical [place]

 

                XR SPINE THORACIC 2 VW 2020 21:17:16 JAYNE Ross [facility]



                                                                Medical [place]

 

                XR CERVICAL SPINE 3 VW 2020 21:17:16 JAYNE Ross [facility]

 

                POCT PREGNANCY TEST 2020 20:35:00 JAYNE Ross [facility]

 

                NOTICE OF PRIVACY 2020 20:10:25 Doctor Racheal [facility]



                PRACTICES                       [place]         [facility]

 

                CONSENT/REFUSAL FOR 2020 20:10:01 Doctor Unassigned, Unive

[facility]



                DIAGNOSIS AND TREATMENT                 [place]         Medical 

[place]

 

                NOTICE OF PRIVACY 2020 19:26:25 Doctor Unassigned, [facility]



                PRACTICES                       [place]         [facility]

 

                CONSENT/REFUSAL FOR 2020 19:26:06 Doctor Jennifer Springer

[facility]



                DIAGNOSIS AND TREATMENT                 [place]         [facility] 

[place]

 

                ASSIGNMENT OF BENEFITS 2019 13:40:42 Doctor Racheal [facility]



                                                [place]         [facility]

 

                POCT RAPID STREP SCREEN 2019 00:00:00 Lucero Jonas  [facility]



                FOR GROUP A                                     Medical Branch

 

                NO SHOW OR MISSED 2019 13:58:16 Doctor Racheal [facility]



                APPOINTMENT POLICY                 No Name         [facility]



                ACKNOWLEDGEMENT                                 

 

                POCT RAPID STREP SCREEN 2019 00:00:00 Emma Pierce [facility]

[facility] [place]



                FOR GROUP A                                     Medical Branch







Encounters







        Start   End     Encounter Admission Attending Care    Care    Encounter 

Source



        Date/Time Date/Time Type    Type    Clinicians Facility Department ID   

   

 

        2021-10-31         Emergency                 [facility]    [id number] 

[facility]



        17:57:14                                                         [facility]

 

        2021-10-28         Emergency                 [facility]    [id number] 

Univers



        23:52:22                                                         [facility]

 

        2021-10-09         Inpatient         PEDRO Muse   [facility]   [id number] 

[facility]



        06:00:00                         [place] [address]      Woman's



                                                                        [facility]

 

        2022 Urgent          Mireille Jackson [facility]    [id number]

[id number] Univers



        15:20:00 15:40:00 Care            Unknown [facility]  [id number]

         ity of



                                                [place] [id number]         [place]

as



                                                RAHUL?BLEA [phone]         [address]                 



                                                OFFICE                  



                                                BUILDING                 

 

        [facility]    [id number]

[id number] Univers



        15:20:00 15:33:51                 MIREILLE                          ity [facility]

 

        2021-10-01 2021-10-04 Inpatient TRUPTI Muse,   [facility]   SUNDAY    [id number]

[id number] [facility]



        08:10:00 13:03:00                 Mouna                 00      Woman'

s



                                                                        Hospita



                                                                        l of



                                                                        [place]

 

        2021 Emergency MICKY Muse,   [facility]   SUNDAY    [id number]

[id number] [facility]



        18:21:00 20:50:00                 Mouna                 47      Woman'

s



                                                                        Hospita



                                                                        l of



                                                                        [place]

 

        [facility]    1.2.840.114 [id number]

62 Univers



        00:00:00 00:00:00                 [facility]  [id number]         it

y of



                                                [place] [id number]         [place]

as



                                                Professio [phone]         [address]                 



                                                One                     

 

        2021 Urgent          Provider, [facility]    

1.2.840.114 

[id number]                                Univers



        11:27:55 11:47:55 Care            Lucero Jonas  [id number]    

     ity of



                                                [place] [id number]         [place]

as



                                                Professio [phone]         [facility]     044             [place]



                                                Office                  



                                                [facility]                 



                                                One                     

 

        2021 Outpatient R               [facility]    [id number]

[id number] Univers



        11:20:00 11:20:00                                                 ity of



                                                                        [facility]

 

        2021 Orders          Doctor MARQUEZ    1.2.840.114 [id number]

13 Univers



        00:00:00 00:00:00 Only            Unassigned, OSMAN   [id number]       

  ity of



                                        [place] [facility] [id number]         [place]

as



                                                        [phone]         [address]

 

        2021 Emergency         [facility]    1.2.840.114 [id number]

[id number] Univers



        21:20:00 22:49:00                 Jaquan DEAN Kyra [id number]        

 ity of



                                                [place] [id number]         [facility]  [phone]         [address]

 

        2021 Orders          Doctor  JIMMY    1.2.840.114 [id number]

62 Univers



        00:00:00 00:00:00 Only            Unassigned, OSMAN   [id number]       

  ity of



                                        [facility] [id number]         [place]

as



                                                        [phone]         [address]

 

        2020 Emergency         JAYNE Ross [facility]    1.2.840.114 [id number]

[id number] Univers



        15:38:52 18:06:00                 Erin Rae [id number]         i

ty of



                                                [place] [id number]         [facility]  [phone]         [address]

 

        2020 Emergency X       JAYNE ROSS [facility]    ERT     [id number]

[id number] Univers



        15:38:52 18:06:00                                                 ity of



                                                                        [facility]

 

        2020 Emergency         [facility]    [id number]

[id number] Univers



        23:11:06 23:37:00                 Nancy LIAM Rae [id number]         

ity of



                                                [place] [id number]         [facility]  [phone]         [address]

 

        2019-10-08 2019-10-08 Emergency X       ANA PAULA, [facility]    ERT     [id number]

[id number] Univers



        12:52:58 18:15:00                 SHINTA                          ity of



                                                                        [facility]

 

        2019 Office          [facility]    1.2.840.114 [id number]

74 Univers



        08:41:52 09:31:22 Visit           [facility]  [id number]         it

y of



                                                [place] [id number]         [place]

as



                                                Professio [phone]         [address]                 



                                                One                     

 

        2019 Orders          Doctor  JIMMY    1.2.840.114 [id number]

75 Univers



        00:00:00 00:00:00 Only            Unassigned, OSMAN   350.1.13.10       

  ity of



                                        [place] [facility] [id number]         [place]

as



                                                        [phone]         [address]

 

        2019 Office          Kari, [facility]    1.2.840.114 [id number]

17 Univers



        09:00:31 09:31:00 Visit           [facility]  [id number]         i

ty of



                                                [place] [id number]         [place]

as



                                                Professio [phone]         [address]                 



                                                One                     

 

        2019 Orders          Doctor  JIMMY    1.2.840.114 [id number]

06 Univers



        00:00:00 00:00:00 Only            Unassigned, OSMAN   350.1.13.10       

  ity of



                                        [place] [facility] [id number]         [place]

as



                                                        [phone]         [address]

 

        2019 Letter          Pcp,    [facility]    1.2.840.114 [id number]

77 Univers



        00:00:00 00:00:00 (Out)           [facility]  350.1.13.10         it

y of



                                        Does Not [place] [id number]         Te

[place]



                                        Have A  Professio [phone]         [address]                 



                                                One                     







Results







           Test Description Test Time  Test Comments Results    Result Comments 

Source









                    HGB HCT             2021-10-03 07:03:00 









                      Test Item  Value      Reference Range Interpretation Comme

nts









             HEMOGLOBIN (test code = HGB) 9.9 g/dL     10.1-13.8    L           

 

 

             HEMATOCRIT (test code = HCT) 30.9 %       32.5-41.8    L           

 



CAPILLARY BLOOD [id number] 05:44:00





             Test Item    Value        Reference Range Interpretation Comments

 

             CAPILLARY BLOOD GAS PH (test code 6.973        7.35-7.45    LL     

      



             = PHC)                                              

 

             CAPILLARY BLOOD GAS PCO2 (test 95.3 mmHg                           

   



             code = PCO2C)                                        

 

             CBG HCO3 (test code = HCO3C) 21.6 meq/L                            

 

 

             CBG BASE EXCESS (test code = BEC) -12.4                            

      

 

             CAPILLARY BLOOD GAS TYPE (test CBLA                                

   



             code = TYPEC)                                        

 

             CAPILLARY BLOOD GAS FIO2 (test 21.0 %                              

   



             code = FIO2C)                                        



CAPILLARY BLOOD [id number] 05:43:00





             Test Item    Value        Reference Range Interpretation Comments

 

             CAPILLARY BLOOD GAS PH (test code 7.260        7.35-7.45    L      

      



             = PHC)                                              

 

             CAPILLARY BLOOD GAS PCO2 (test 53.3 mmHg                           

   



             code = PCO2C)                                        

 

             CAPILLARY BLOOD GAS PO2 (test code 19.4 mmHg                       

       



             = PO2C)                                             

 

             CBG HCO3 (test code = HCO3C) 23.4 meq/L                            

 

 

             CBG BASE EXCESS (test code = BEC) -4.3                             

      

 

             CBG O2 SATURATION (test code = 24.2 %                              

   



             SATC)                                               

 

             CAPILLARY BLOOD GAS TYPE (test CBLV                                

   



             code = TYPEC)                                        

 

             CAPILLARY BLOOD GAS FIO2 (test 21.0 %                              

   



             code = FIO2C)                                        



AG HEPATITIS B SURFACE2021-10-01 11:00:00





             Test Item    Value        Reference Range Interpretation Comments

 

             AG HEPATITIS B SURFACE (test code NONREACTIVE  NONREACTIVE         

      



             = HBSAG)                                            



AB HEPATITIS C PROFILE2021-10-01 11:00:00





             Test Item    Value        Reference Range Interpretation Comments

 

             AB HEPATITIS C (test code = NONREACTIVE  NONREACTIVE               



             HCVAB)                                              

 

             SIGNAL TO CUTOFF (test code = <0.02        <0.80        N          

  



             CUTOFF)                                             



AB TREPONEMA2021-10-01 11:00:00





             Test Item    Value        Reference Range Interpretation Comments

 

             AB TREPONEMA (test code = TREPAB) NONREACTIVE  NONREACTIVE         

      



AB HIV 1 22021-10-01 11:00:00





             Test Item    Value        Reference Range Interpretation Comments

 

             AB HIV 1 2 (test NONREACTIVE  NONREACTIVE               [facility]



             code = [id number])                                        4th Gen HIV 

Ag/Ab Combo



                                                                 Screen



COVID 19 Asymptomatic IH AG2021-10-01 09:59:00





             Test Item    Value        Reference Range Interpretation Comments

 

             COVID 19     NEGATIVE     NEGATIVE                  This test has b

een



             Asymptomatic IH AG                                        authorize

d only for the



             (test code =                                        detection ofpro

teins from



             COVNONPUIAG)                                        SARS-CoV-2, not

 for any



                                                                 other viruses



                                                                 orpathogens. Ne

gative



                                                                 results should 

be treated



                                                                 as presumptive



                                                                 andconfirmed wi

th a



                                                                 molecular assay

, if



                                                                 necessary for



                                                                 patientmanageme

nt.



                                                                 Negative result

s do not



                                                                 rule out COVID-

19



                                                                 andshould not b

e used as



                                                                 the sole basis 

for



                                                                 treatment orpat

ient



                                                                 management deci

sions,



                                                                 including infec

tion



                                                                 controldecision

s.



                                                                 Negative result

s should



                                                                 be considered i

n



                                                                 thecontext of a

 patient's



                                                                 recent exposure

s, history



                                                                 and thepresence

 of



                                                                 clinical signs 

and



                                                                 symptoms consis

tent



                                                                 withCOVID-19. T

his test



                                                                 has not been FD

A cleared



                                                                 or approved; th

e test



                                                                 hasbeen authori

melida by FDA



                                                                 under an Emerge

ncy Use



                                                                 Authorization(E

UA) for



                                                                 use by laborato

maddy



                                                                 certified under

 the CLIA



                                                                 thatmeet the re

quirements



                                                                 to perform mode

rate, high



                                                                 or waivedcomple

xity



                                                                 tests. This cecy

t is



                                                                 authorized for 

use at



                                                                 thePoint of Car

e (POC),



                                                                 i.e., in patien

t care



                                                                 settingsoperati

ng under a



                                                                 CLIA Certificat

e of



                                                                 Waiver, Certifi

salome



                                                                 ofCompliance, o

r



                                                                 Certificate of



                                                                 Accreditation. 

This test



                                                                 is only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

## 2023-01-25 ENCOUNTER — HOSPITAL ENCOUNTER (EMERGENCY)
Dept: HOSPITAL 97 - ER | Age: 23
Discharge: HOME | End: 2023-01-25
Payer: COMMERCIAL

## 2023-01-25 VITALS — OXYGEN SATURATION: 100 % | SYSTOLIC BLOOD PRESSURE: 117 MMHG | DIASTOLIC BLOOD PRESSURE: 67 MMHG | TEMPERATURE: 98.2 F

## 2023-01-25 DIAGNOSIS — Z48.00: Primary | ICD-10-CM

## 2023-01-25 NOTE — EDPHYS
Physician Documentation                                                                           

 [facility]                                                                 

Name: Tram Holden                                                                            

Age: 22 yrs                                                                                       

Sex: Female                                                                                       

: 2000                                                                                   

MRN: [id number]                                                                                   

Arrival Date: 2023                                                                          

Time: 09:48                                                                                       

Account#: [id number]                                                                            

Bed 10                                                                                            

Private MD:                                                                                       

ED Physician Sander Santos                                                                     

HPI:                                                                                              

                                                                                             

09:57 This 22 yrs old  Female presents to ER via Ambulatory with complaints of Wound  jmm 

      Recheck.                                                                                    

09:57 Patient presents to ED for recheck of: abscess. This is a 22 year old female with no    jmm 

      chronic medical conditions that presents to the ED with s/p incision and drainage.          

      Unable to follow up with surgery. Denies fever. States feeling better since incision        

      and drainage.                                                                               

                                                                                                  

OB/GYN:                                                                                           

10:27 LMP N/A - Birth control method                                                           

                                                                                                  

Historical:                                                                                       

- Allergies:                                                                                      

10:27 No Known Allergies;                                                                      

- Home Meds:                                                                                      

10:27 None [Active];                                                                          jl7 

- PMHx:                                                                                           

10:27 None;                                                                                    

- PSHx:                                                                                           

10:27  section; Cholecystectomy; Tonsillectomy;                                        

                                                                                                  

- Immunization history:: Adult Immunizations unknown.                                             

- Social history:: Smoking status: unknown.                                                       

                                                                                                  

                                                                                                  

ROS:                                                                                              

09:57 Constitutional: Negative for fever, chills, and weight loss, Cardiovascular: Negative   jmm 

      for chest pain, palpitations, and edema, Respiratory: Negative for shortness of breath,     

      cough, wheezing, and pleuritic chest pain.                                                  

09:57 All other systems are negative.                                                             

                                                                                                  

Exam:                                                                                             

09:57 Constitutional:  This is a well developed, well nourished patient who is awake, alert,  jmm 

      and in no acute distress. Head/Face:  atraumatic. Eyes:  EOMI, no conjunctival erythema     

      appreciated ENT:  Moist Mucus Membranes Neck:  Trachea midline, Supple Chest/axilla:        

      Normal chest wall appearance and motion.   Cardiovascular:  Regular rate and rhythm.        

      No edema appreciated Respiratory:  Normal respirations, no respiratory distress             

      appreciated Abdomen/GI:  Non distended Back:  Normal ROM                                    

09:57 Skin: packing in place, no surrounding induration and erythema, non tender to               

      palpation.                                                                                  

09:57 Neuro: Orientation: is normal, Mentation: is normal, Memory: is normal.                     

09:57 Psych: Behavior/mood is pleasant, cooperative.                                              

                                                                                                  

Vital Signs:                                                                                      

09:56  / 67; Pulse 81; Resp 16; Temp 98.2; Pulse Ox 100% on R/A;                        iw  

                                                                                                  

MDM:                                                                                              

09:57 Patient medically screened.                                                             [facility] 

10:07 Data reviewed: vital signs, nurses notes. Management of patient was discussed with the  jmm 

      following: Consultant: Dr. Jolly. Counseling: I had a detailed discussion with the        

      patient and/or guardian regarding: the historical points, exam findings, and any            

      diagnostic results supporting the discharge/admit diagnosis, the need for outpatient        

      follow up, to return to the emergency department if symptoms worsen or persist or if        

      there are any questions or concerns that arise at home. ED course: Packing removed.         

      Patient tolerated the procedure well. .                                                     

                                                                                                  

Administered Medications:                                                                         

No medications were administered                                                                  

                                                                                                  

                                                                                                  

Disposition:                                                                                      

11:53 Co-signature as Attending Physician, Sander Santos MD I reviewed the patient's care   rt  

      provided by the Advanced Practice Provider and agree with the diagnosis and treatment       

      plan.                                                                                       

                                                                                                  

Disposition Summary:                                                                              

23 10:10                                                                                    

Discharge Ordered                                                                                 

      Location: Home                                                                          [facility] 

      Condition: Stable                                                                       [facility] 

      Diagnosis                                                                                   

        - Encounter for change or removal of nonsurgical wound dressing                       [facility] 

      Followup:                                                                               [facility] 

        - With: Private Physician                                                                  

        - When: 2 - 3 days                                                                         

        - Reason: Recheck today's complaints, Continuance of care, Re-evaluation by your           

      physician                                                                                   

      Discharge Instructions:                                                                     

        - Discharge Summary Sheet                                                             [facility] 

        - How to Change Your Wound Dressing                                                   [facility] 

        - Incision and Drainage, Care After                                                   [facility] 

      Forms:                                                                                      

        - Medication Reconciliation Form                                                      [facility] 

        - Thank You Letter                                                                    [facility] 

        - Work release form                                                                   [facility] 

        - Antibiotic Education                                                                jm 

        - Prescription Opioid Use                                                             [facility] 

Signatures:                                                                                       

Medhat Rothman PA PA jmm Leal, Jahala, RASHEEDA                        RN   jl7                                                  

Sander Santos MD MD   rt                                                   

                                                                                                  

**************************************************************************************************

## 2023-01-25 NOTE — XMS REPORT
Continuity of Care Document

                           Created on:2023



Patient:CHINA BARRY

Sex:Female

:2000

External Reference #:[id number]





Demographics







                          Address                   [address]



                                                    [place] [address]

 

                          Home Phone                [phone]

 

                          Work Phone                [phone]

 

                          Mobile Phone              [phone]

 

                          Email Address             [email or web address]

 

                          Preferred Language        spa

 

                          Marital Status            Unknown

 

                          Muslim Affiliation     Unknown

 

                          Race                      Unknown

 

                          Additional Race(s)        Unavailable



                                                    Unavailable

 

                          Ethnic Group              Unknown









Author







                          Organization              [facility]

t

 

                          Address                   [address]



                                                    [place] [address]

 

                          Phone                     [phone]









Support







                Name            Relationship    Address         Phone

 

                KVNG BARRY               [address]     [phone]



                                                [place] [address] 

 

                RUI BARRY               [address]     [place]

[place] [address] 

 

                NALDO YADAV Significant     [address]        [phone]

[phone]



                                                [place] [phone] 

 

                Rui Zayas Mother          [address]     [phone]

[phone]



                                                [place] [address] 









Care Team Providers







                    Name                Role                Phone

 

                    PCP, PATIENT DOES NOT HAVE A Primary Care Physician [place]

ble

 

                    Mouna Muse     Attending Clinician Unavailable

 

                    Mireille Jackson MD     Attending Clinician [phone]

 

                    Unknown, Attending  Attending Clinician Unavailable

 

                    MIREILLE JACKSON        Attending Clinician Unavailable

 

                    Sumi RIZZOPLucero  Attending Clinician [phone]

 

                    Provider, [facility] Attending Clinician Unavailable

 

                    Doctor Unassigned, No Name Attending Clinician Unavailable

 

                    Diana WALLER, Jaquan F Attending Clinician [phone]

 

                    JAYNE Amaro Attending Clinician [phone]

 

                    JAYNE ROSS     Attending Clinician Unavailable

 

                    Nancy Gomez MD Attending Clinician [phone]

 

                    MATTHEW GOSS     Attending Clinician Unavailable

 

                    Emma Holt Attending Clinician [phone]

 

                    Pcp, Patient Does Not Have A Attending Clinician [phone]

[phone]

 

                    Mouna Muse     Admitting Clinician Unavailable

 

                    JAYNE ROSS     Admitting Clinician Unavailable

 

                    MATTHEW GOSS     Admitting Clinician Unavailable









Payers







           Payer Name Policy Type Policy Number Effective Date Expiration Date LIAM augustine

 

           [facility] [place]            [id number] 2016 00:00:00            







Problems







       Condition Condition Condition Status Onset  Resolution Last   Treating Co

mments 

Source



       Name   Details Category        Date   Date   Treatment Clinician        



                                                 Date                 

 

       Flexural Flexural Disease Active                              Unive

rs



       eczema eczema               9-30                               ity of



                                   00:00:                             [place]



                                   00                                 Medical



                                                                      Branch

 

       Anxiety Anxiety Disease Active                              [facility]



                                   9-30                               ity of



                                   00:00:                             [place]



                                   00                                 Medical



                                                                      Branch

 

       Positive Positive Disease Active                              Unive

rs



       JULIAN    JULIAN                  2-20                               ity of



       (antinucle (antinucle               00:00:                             [facility]

[place]



       ar     ar                   00                                 Medical



       antibody) antibody)                                                  [facility]

ch

 

       H. pylori H. pylori Disease Active                       Overview: 

Univers



       infection infection               1-18                        Added  ity 

of



                                   00:00:                      automatic [place]



                                   00                          ally from Medical



                                                               request Branch



                                                               for    



                                                               surgery 



                                                               [id number] 

 

       Gastritis Gastritis Disease Active                       Overview: 

Univers



       without without               1-18                        Added  ity of



       bleeding, bleeding,               00:00:                      automatic [facility]

[place]



       unspecifie unspecifie               00                          ally from

 Medical



       d      d                                                request Branch



       chronicity chronicity                                           for    



       ,      ,                                                surgery 



       unspecifie unspecifie                                           [id number] 



       d      d                                                       



       gastritis gastritis                                                  



       type   type                                                    

 

       Gastritis, Gastritis, Disease Active                              U

nivers



       presence presence               1-08                               ity of



       of     of                   00:00:                             [place]



       bleeding bleeding               00                                 Medica

l



       unspecifie unspecifie                                                  Br

anch



       d,     d,                                                      



       unspecifie unspecifie                                                  



       d      d                                                       



       chronicity chronicity                                                  



       ,      ,                                                       



       unspecifie unspecifie                                                  



       d      d                                                       



       gastritis gastritis                                                  



       type   type                                                    

 

       Post-jasmina Post-jasmina Disease Active                              U

baisa



       cystectomy cystectomy               1-08                               it

y of



       syndrome syndrome               00:00:                             [place]



                                   00                                 Medical



                                                                      Branch

 

       Epigastric Epigastric Disease Active                              U

nivers



       abdominal abdominal               7-29                               ity 

of



       pain   pain                 00:00:                             [place]



                                   00                                 Medical



                                                                      Branch

 

       Migraines Migraines Disease Active                                    Uni

vers



                                                                      ity of



                                                                      [place]



                                                                      Medical



                                                                      Branch







Allergies, Adverse Reactions, Alerts







       Allergy Allergy Status Severity Reaction(s) Onset  Inactive Treating Comm

ents 

Source



       Name   Type                        Date   Date   Clinician        

 

       No Known DA     Active U                                   [facility]



       Allergie                             8                        [facility]



       [facility]                                  [facility]00:                      [facility]



                                          00                          l of



                                                                      [place]

 

       No Known DA     Active U                                   [facility]



       Allergie                                                     [facility]



       s                                  00:00:                      [facility]



                                          00                          l of



                                                                      [place]

 

       NO KNOWN Drug   Active                                           [facility]



       ALLERGIE Class                                                   ity of



       S                                                              [facility]







Social History







           Social Habit Start Date Stop Date  Quantity   Comments   Source

 

           ASSERTION  2021            Pregnant              [facility]



                      00:00:00                                    [facility]

 

           Exposure to 2022-11-10 2022 Not sure              [facility]



           SARS-CoV-2 00:00:00   15:27:00                         [facility]



           (event)                                                [place]

 

           Tobacco use and 2022 Smokeless tobacco            Un

iversity of



           exposure   00:00:00   00:00:00   non-user              [facility]

 

           Alcohol intake 2022 Ex-drinker            [facility]



                      00:00:00   00:00:00   (finding)             [facility]

 

           Alcohol Comment 2017 Socially              [facility]

y of



                      00:00:00   00:00:00                         [facility]

 

           Sex Assigned At [birth year]                       [facility]

y of



           Birth      00:00:00   00:00:00                         [facility]









                Smoking Status  Start Date      Stop Date       Source

 

                Never smoked tobacco                                 [facility]







Medications







       Ordered Filled Start  Stop   Current Ordering Indication Dosage Frequency

 Signature

                    Comments            Components          Source



     Medication Medication Date Date Medication? Clinician                (SIG) 

          



     Name Name                                                   

 

     neomycin-po      2022      Yes       [id number] 4[drp]      Place 4        

   Univers



     lymyxin-hyd      1-20                               Drops in           ity 

of



     rocortisone      00:00:                               right ear           T

[facility]



     otic      00                                 4 (four)           Medical



     solution                                         times           [place]



                                                  daily.           

 

     loratadine      2021- No        [id number] 10mg      Take 1           

Univers



     (CLARITIN)      --15                          tablet by           ity

 of



     10 mg      00:00: 04:59                          mouth           [place]



     tablet      00   :00                           daily for           Medical



                                                  30 days.           Branch

 

     loratadine      2021- No        [id number] 10mg      Take 1           

Univers



     (CLARITIN)      6-14 07-15                          tablet by           ity

 of



     10 mg      00:00: 04:59                          mouth           [place]



     tablet      00   :00                           daily for           Medical



                                                  30 days.           Branch

 

     acetaminoph      2021- No             1000mg      1,000 mg,         

  Univers



     en         05-09                          Oral,           ity of



     (TYLENOL)      04:15: 03:21                          ONCE, 1           [place]

s



     tablet      00   :00                           dose, Sat           Medical



     1,000 mg                                         5/8/21 at           Branch



                                                  2315,           



                                                  Routine           

 

     cyclobenzap      2020-0      Yes       [id number] 10mg      Take 1          

 [facility]



     rine 10 mg      6-08                               tablet by           ity 

of



     tablet      00:00:                               mouth 3           [place]



               00                                 (three)           Medical



                                                  times           Branch



                                                  daily.           

 

     ibuprofen      2020-0      Yes       [id number] 600mg      Take 1           

Univers



     600 mg      6-08                               tablet by           ity of



     tablet      00:00:                               mouth           [place]



               00                                 every 6           Medical



                                                  (six)           Branch



                                                  hours as           



                                                  needed for           



                                                  Pain           



                                                  (scale           



                                                  4-6).           

 

     cyclobenzap      2020-0      Yes       [id number] 10mg      Take 1          

 [facility]



     rine 10 mg      6-08                               tablet by           ity 

of



     tablet      00:00:                               mouth 3           [place]



               00                                 (three)           Medical



                                                  times           Branch



                                                  daily.           

 

     ibuprofen      2020-0      Yes       [id number] 600mg      Take 1           

Univers



     600 mg      6-08                               tablet by           ity of



     tablet      00:00:                               mouth           [place]



               00                                 every 6           Medical



                                                  (six)           Branch



                                                  hours as           



                                                  needed for           



                                                  Pain           



                                                  (scale           



                                                  4-6).           

 

     cyclobenzap      2020-0      Yes       [id number] 10mg      Take 1          

 Univers



     rine 10 mg      6-08                               tablet by           ity 

of



     tablet      00:00:                               mouth 3           [place]



               00                                 (three)           Medical



                                                  times           Branch



                                                  daily.           

 

     ibuprofen      2020-0      Yes       [id number] 600mg      Take 1           

Univers



     600 mg      6-08                               tablet by           ity of



     tablet      00:00:                               mouth           [place]



               00                                 every 6           Medical



                                                  (six)           Branch



                                                  hours as           



                                                  needed for           



                                                  Pain           



                                                  (scale           



                                                  4-6).           

 

     cyclobenzap      2020-0      Yes       [id number] 10mg      Take 1          

 Univers



     rine 10 mg      6-08                               tablet by           ity 

of



     tablet      00:00:                               mouth 3           [place]



               00                                 (three)           Medical



                                                  times           Branch



                                                  daily.           

 

     ibuprofen      2020-0      Yes       [id number] 600mg      Take 1           

Univers



     600 mg      6-08                               tablet by           ity of



     tablet      00:00:                               mouth           [place]



               00                                 every 6           Medical



                                                  (six)           Branch



                                                  hours as           



                                                  needed for           



                                                  Pain           



                                                  (scale           



                                                  4-6).           

 

     cyclobenzap      2020-0      Yes       [id number] 10mg      Take 1          

 [facility]



     rine 10 mg      6-08                               tablet by           ity 

of



     tablet      00:00:                               mouth 3           [place]



               00                                 (three)           Medical



                                                  times           Branch



                                                  daily.           

 

     ibuprofen      2020-0      Yes       [id number] 600mg      Take 1           

Univers



     600 mg      6-08                               tablet by           ity of



     tablet      00:00:                               mouth           [place]



               00                                 every 6           Medical



                                                  (six)           Branch



                                                  hours as           



                                                  needed for           



                                                  Pain           



                                                  (scale           



                                                  4-6).           

 

     cyclobenzap      2020-0      Yes       [id number] 10mg      Take 1          

 Univers



     rine 10 mg      6-08                               tablet by           ity 

of



     tablet      00:00:                               mouth 3           [place]



               00                                 (three)           Medical



                                                  times           Branch



                                                  daily.           

 

     ibuprofen      2020-0      Yes       [id number] 600mg      Take 1           

Univers



     600 mg      6-08                               tablet by           ity of



     tablet      00:00:                               mouth           [place]



               00                                 every 6           Medical



                                                  (six)           Branch



                                                  hours as           



                                                  needed for           



                                                  Pain           



                                                  (scale           



                                                  4-6).           

 

     cyclobenzap      2020-0      Yes       [id number] 10mg      Take 1          

 Univers



     rine 10 mg      6-08                               tablet by           ity 

of



     tablet      00:00:                               mouth 3           [place]



               00                                 (three)           Medical



                                                  times           Branch



                                                  daily.           

 

     ibuprofen      2020-0      Yes       [id number] 600mg      Take 1           

Univers



     600 mg      6-08                               tablet by           ity of



     tablet      00:00:                               mouth           [place]



               00                                 every 6           Medical



                                                  (six)           Branch



                                                  hours as           



                                                  needed for           



                                                  Pain           



                                                  (scale           



                                                  4-6).           

 

     ibuprofen      2020-0 2020- No        [id number] 600mg      Take 1          

 [facility]



     600 mg      6-08 06-08                          tablet by           ity of



     tablet      00:00: 00:00                          mouth           [place]



               00   :00                           every 6           Medical



                                                  (six)           Branch



                                                  hours as           



                                                  needed for           



                                                  Pain           



                                                  (scale           



                                                  4-6).           

 

     ibuprofen      -0 2020- No        [id number] 600mg      Take 1          

 Univers



     600 mg      6-08 06-08                          tablet by           ity of



     tablet      00:00: 00:00                          mouth           [place]



               00   :00                           every 6           Medical



                                                  (six)           Branch



                                                  hours as           



                                                  needed for           



                                                  Pain           



                                                  (scale           



                                                  4-6).           

 

     traMADol      2019      Yes       [id number] 50mg      Take 1           [facility]

[facility]



     (ULTRAM) 50      0-22                               tablet by           [facility]

 of



     mg tablet      00:00:                               mouth           [place]



               00                                 every 6           Medical



                                                  (six)           Branch



                                                  hours as           



                                                  needed for           



                                                  Pain           



                                                  (scale           



                                                  7-10).           

 

     traMADol      2019      Yes       [id number] 50mg      Take 1           [facility]

[facility]



     (ULTRAM) 50      0-22                               tablet by           [facility]

 of



     mg tablet      00:00:                               mouth           [place]



               00                                 every 6           Medical



                                                  (six)           Branch



                                                  hours as           



                                                  needed for           



                                                  Pain           



                                                  (scale           



                                                  7-10).           

 

     traMADol      2019      Yes       [id number] 50mg      Take 1           [facility]

[facility]



     (ULTRAM) 50      0-22                               tablet by           [facility]

 of



     mg tablet      00:00:                               mouth           [place]



               00                                 every 6           Medical



                                                  (six)           Branch



                                                  hours as           



                                                  needed for           



                                                  Pain           



                                                  (scale           



                                                  7-10).           

 

     traMADol      2019      Yes       [id number] 50mg      Take 1           [facility]

[facility]



     (ULTRAM) 50      0-22                               tablet by           [facility]

 of



     mg tablet      00:00:                               mouth           [place]



               00                                 every 6           Medical



                                                  (six)           Branch



                                                  hours as           



                                                  needed for           



                                                  Pain           



                                                  (scale           



                                                  7-10).           

 

     traMADol      2019      Yes       [id number] 50mg      Take 1           [facility]

[facility]



     (ULTRAM) 50      0-22                               tablet by           [facility]

 of



     mg tablet      00:00:                               mouth           [place]



               00                                 every 6           Medical



                                                  (six)           Branch



                                                  hours as           



                                                  needed for           



                                                  Pain           



                                                  (scale           



                                                  7-10).           

 

     traMADol      2019      Yes       [id number] 50mg      Take 1           [facility]

[facility]



     (ULTRAM) 50      0-22                               tablet by           [facility]

 of



     mg tablet      00:00:                               mouth           [place]



               00                                 every 6           Medical



                                                  (six)           Branch



                                                  hours as           



                                                  needed for           



                                                  Pain           



                                                  (scale           



                                                  7-10).           

 

     traMADol      2019      Yes       [id number] 50mg      Take 1           [facility]

[facility]



     (ULTRAM) 50      0-22                               tablet by           [facility]

 of



     mg tablet      00:00:                               mouth           [place]



               00                                 every 6           Medical



                                                  (six)           Branch



                                                  hours as           



                                                  needed for           



                                                  Pain           



                                                  (scale           



                                                  7-10).           

 

     traMADol      2019      Yes       [id number] 50mg      Take 1           [facility]

[facility]



     (ULTRAM) 50      0-22                               tablet by           [facility]

 of



     mg tablet      00:00:                               mouth           [place]



               00                                 every 6           Medical



                                                  (six)           Branch



                                                  hours as           



                                                  needed for           



                                                  Pain           



                                                  (scale           



                                                  7-10).           

 

     ondansetron      2019      Yes       [id number] 4mg       Take 1           

[facility]



     (ZOFRAN      0-08                               tablet by           [facility] of



     ODT) 4 mg      00:00:                               mouth           [place]



     disintegrat      00                                 every 8           Medic

al



     ing tablet                                         (eight)           Branch



                                                  hours as           



                                                  needed for           



                                                  Nausea and           



                                                  Vomiting           



                                                  (N/V).           

 

     ibuprofen      2019      Yes       [id number] 600mg      Take 1           [facility]

[facility]



     600 mg      0-08                               tablet by           [facility] of



     tablet      00:00:                               mouth           [place]



               00                                 every 8           Medical



                                                  (eight)           Branch



                                                  hours as           



                                                  needed for           



                                                  Pain           



                                                  (scale           



                                                  4-6).           

 

     ondansetron      2019      Yes       [id number] 4mg       Take 1           

[facility]



     (ZOFRAN      0-08                               tablet by           [facility] of



     ODT) 4 mg      00:00:                               mouth           [place]



     disintegrat      00                                 every 8           Medic

al



     ing tablet                                         (eight)           Branch



                                                  hours as           



                                                  needed for           



                                                  Nausea and           



                                                  Vomiting           



                                                  (N/V).           

 

     ibuprofen      2019      Yes       [id number] 600mg      Take 1           [facility]

[facility]



     600 mg      0-08                               tablet by           [facility] of



     tablet      00:00:                               mouth           [place]



               00                                 every 8           Medical



                                                  (eight)           Branch



                                                  hours as           



                                                  needed for           



                                                  Pain           



                                                  (scale           



                                                  4-6).           

 

     ondansetron      2019      Yes       [id number] 4mg       Take 1           

[facility]



     (ZOFRAN      0-08                               tablet by           [facility] of



     ODT) 4 mg      00:00:                               mouth           [place]



     disintegrat      00                                 every 8           Medic

al



     ing tablet                                         (eight)           Branch



                                                  hours as           



                                                  needed for           



                                                  Nausea and           



                                                  Vomiting           



                                                  (N/V).           

 

     ibuprofen      2019      Yes       [id number] 600mg      Take 1           [facility]

[facility]



     600 mg      0-08                               tablet by           [facility] of



     tablet      00:00:                               mouth           [place]



               00                                 every 8           Medical



                                                  (eight)           Branch



                                                  hours as           



                                                  needed for           



                                                  Pain           



                                                  (scale           



                                                  4-6).           

 

     ondansetron      2019      Yes       [id number] 4mg       Take 1           

[facility]



     (ZOFRAN      0-08                               tablet by           [facility] of



     ODT) 4 mg      00:00:                               mouth           [place]



     disintegrat      00                                 every 8           Medic

al



     ing tablet                                         (eight)           [facility]anch



                                                  hours as           



                                                  needed for           



                                                  Nausea and           



                                                  Vomiting           



                                                  (N/V).           

 

     ibuprofen      2019      Yes       [id number] 600mg      Take 1           [facility]

[facility]



     600 mg      0-08                               tablet by           [facility] of



     tablet      00:00:                               mouth           [place]



               00                                 every 8           Medical



                                                  (eight)           Branch



                                                  hours as           



                                                  needed for           



                                                  Pain           



                                                  (scale           



                                                  4-6).           

 

     ondansetron      2019      Yes       [id number] 4mg       Take 1           

[facility]



     (ZOFRAN      0-08                               tablet by           [facility] of



     ODT) 4 mg      00:00:                               mouth           [place]



     disintegrat      00                                 every 8           Medic

al



     ing tablet                                         (eight)           [facility]anch



                                                  hours as           



                                                  needed for           



                                                  Nausea and           



                                                  Vomiting           



                                                  (N/V).           

 

     ibuprofen      2019      Yes       [id number] 600mg      Take 1           [facility]

[facility]



     600 mg      0-08                               tablet by           [facility] of



     tablet      00:00:                               mouth           [place]



               00                                 every 8           Medical



                                                  (eight)           [facility]anch



                                                  hours as           



                                                  needed for           



                                                  Pain           



                                                  (scale           



                                                  4-6).           

 

     ondansetron      2019      Yes       [id number] 4mg       Take 1           

[facility]



     (ZOFRAN      0-08                               tablet by           [facility] of



     ODT) 4 mg      00:00:                               mouth           [place]



     disintegrat      00                                 every 8           Medic

al



     ing tablet                                         (eight)           [facility]



                                                  hours as           



                                                  needed for           



                                                  Nausea and           



                                                  Vomiting           



                                                  (N/V).           

 

     ibuprofen      2019      Yes       [id number] 600mg      Take 1           [facility]

[facility]



     600 mg      0-08                               tablet by           [facility] of



     tablet      00:00:                               mouth           [place]



               00                                 every 8           Medical



                                                  (eight)           Branch



                                                  hours as           



                                                  needed for           



                                                  Pain           



                                                  (scale           



                                                  4-6).           

 

     ondansetron      2019      Yes       [id number] 4mg       Take 1           

[facility]



     (ZOFRAN      0-08                               tablet by           [facility] of



     ODT) 4 mg      00:00:                               mouth           [place]



     disintegrat      00                                 every 8           Medic

al



     ing tablet                                         (eight)           [facility]



                                                  hours as           



                                                  needed for           



                                                  Nausea and           



                                                  Vomiting           



                                                  (N/V).           

 

     ibuprofen      2019      Yes       [id number] 600mg      Take 1           [facility]

[facility]



     600 mg      0-08                               tablet by           [facility] of



     tablet      00:00:                               mouth           [place]



               00                                 every 8           Medical



                                                  (eight)           Branch



                                                  hours as           



                                                  needed for           



                                                  Pain           



                                                  (scale           



                                                  4-6).           

 

     ondansetron      2019      Yes       [id number] 4mg       Take 1           

[facility]



     (ZOFRAN      0-08                               tablet by           [facility] of



     ODT) 4 mg      00:00:                               mouth           [place]



     disintegrat      00                                 every 8           Medic

al



     ing tablet                                         (eight)           [facility]anch



                                                  hours as           



                                                  needed for           



                                                  Nausea and           



                                                  Vomiting           



                                                  (N/V).           

 

     ibuprofen      2019      Yes       [id number] 600mg      Take 1           [facility]

[facility]



     600 mg      0-08                               tablet by           [facility] of



     tablet      00:00:                               mouth           [place]



               00                                 every 8           Medical



                                                  (eight)           Branch



                                                  hours as           



                                                  needed for           



                                                  Pain           



                                                  (scale           



                                                  4-6).           

 

     SERTraline            Yes       [id number] 25mg      Take 1           [facility]

[facility]



     25 mg      9-30                               tablet by           [facility] of



     tablet      00:00:                               mouth           [place]



               00                                 every           Medical



                                                  morning.           Branch

 

     mometasone      0      Yes       [id number]           Apply to          

 [facility]



     0.1 % cream      9-30                               area(s)           ity o

f



               00:00:                               daily.           [place]



               00                                                Medical



                                                                 [facility]

 

     SERTraline      0      Yes       [id number] 25mg      Take 1           [facility]

[facility]



     25 mg      9-30                               tablet by           [facility] of



     tablet      00:00:                               mouth           [place]



               00                                 every           Medical



                                                  morning.           [facility]anch

 

     mometasone      -0      Yes       [id number]           Apply to          

 [facility]



     0.1 % cream      9-30                               area(s)           ity o

f



               00:00:                               daily.           [place]



               00                                                [facility]



                                                                 [facility]

 

     SERTraline      0      Yes       [id number] 25mg      Take 1           [facility]

n[facility]



     25 mg      9-30                               tablet by           [facility] of



     tablet      00:00:                               mouth           [place]



               00                                 every           Medical



                                                  morning.           [facility]anch

 

     mometasone      2019-0      Yes       [id number]           Apply to          

 [facility]



     0.1 % cream      9-30                               area(s)           ity o

f



               00:00:                               daily.           [place]



               00                                                Medical



                                                                 [facility]anch

 

     SERTraline      0      Yes       [id number] 25mg      Take 1           [facility]

n[facility]



     25 mg      9-30                               tablet by           [facility] of



     tablet      00:00:                               mouth           [place]



               00                                 every           Medical



                                                  morning.           Branch

 

     mometasone      -0      Yes       [id number]           Apply to          

 [facility]



     0.1 % cream      9-30                               area(s)           ity o

f



               00:00:                               daily.           [address]

 

     SERTraline            Yes       [id number] 25mg      Take 1           [facility]

n[facility]



     25 mg      9-30                               tablet by           ity of



     tablet      00:00:                               mouth           [place]



               00                                 every           Medical



                                                  morning.           [place]

 

     mometasone            Yes       [id number]           Apply to          

 [facility]



     0.1 % cream      9-30                               area(s)           ity o

f



               00:00:                               daily.           [address]

 

     SERTraline            Yes       [id number] 25mg      Take 1           [facility]

n[facility]



     25 mg      9-30                               tablet by           ity of



     tablet      00:00:                               mouth           [place]



               00                                 every           Medical



                                                  morning.           [place]

 

     mometasone            Yes       [id number]           Apply to          

 [facility]



     0.1 % cream      9-30                               area(s)           ity o

f



               00:00:                               daily.           [address]

 

     SERTraline            Yes       [id number] 25mg      Take 1           [facility]

n[facility]



     25 mg      9-30                               tablet by           ity of



     tablet      00:00:                               mouth           [place]



               00                                 every           Medical



                                                  morning.           [place]

 

     mometasone            Yes       [id number]           Apply to          

 [facility]



     0.1 % cream      9-30                               area(s)           ity o

f



               00:00:                               daily.           [address]

 

     SERTraline            Yes       [id number] 25mg      Take 1           [facility]

n[facility]



     25 mg      9-30                               tablet by           ity of



     tablet      00:00:                               mouth           [place]



               00                                 every           Medical



                                                  morning.           [place]

 

     mometasone            Yes       [id number]           Apply to          

 [facility]



     0.1 % cream      9-30                               area(s)           ity o

f



               00:00:                               daily.           [address]

 

     cefTRIAXone       2019- No        [id number] 1000mg                    

 [facility]



     (ROCEPHIN)                                               ity of



     injection      15:15: 14:30                                         [place]



     1,000 mg      00   :00                                          [facility]

 

     cefTRIAXone       2019- No        [id number] 1000mg      1,000 mg,     

      [facility]



     (ROCEPHIN)                                Intramuscu           it

y of



     injection      15:15: 14:30                          lar, ONCE,           [facility]

[place]



     1,000 mg      00   :00                           1 dose,           Medical



                                                  Mon 19           Branch



                                                  at 1015,           



                                                  ASAP<br>Re           



                                                  ason for           



                                                  Anti-Infec           



                                                  tive:           



                                                  Documented           



                                                  Infection<           



                                                  br>Documen           



                                                  cristian            



                                                  Infection           



                                                  Site:           



                                                  Respirator           



                                                  [email or web address]           



                                                  ion of           



                                                  Therapy: 7           



                                                  days           

 

     cefTRIAXone       2019- No        [id number] 1000mg                    

 [facility]



     (ROCEPHIN)                                               ity of



     injection      15:15: 14:30                                         [place]



     1,000 mg      00   :00                                          Medical



                                                                 Branch

 

     cefTRIAXone       2019- No        [id number] 1000mg      1,000 mg,     

      [facility]



     (ROCEPHIN)                                Intramuscu           it

y of



     injection      15:15: 14:30                          lar, ONCE,           T

[place]



     1,000 mg      00   :00                           1 dose,           Medical



                                                  Mon 19           Branch



                                                  at 1015,           



                                                  ASAP<br>Re           



                                                  ason for           



                                                  Anti-Infec           



                                                  tive:           



                                                  Documented           



                                                  Infection<           



                                                  br>Documen           



                                                  cristian            



                                                  Infection           



                                                  Site:           



                                                  Respirator           



                                                  [email or web address]<br>Durat           



                                                  ion of           



                                                  Therapy: 7           



                                                  days           

 

     amoxicillin       2019- No        [id number] 500mg      Take 1         

  [facility]



     500 mg                                capsule by           ity of



     capsule      00:00: 04:59                          mouth 2           [place]



               00   :00                           (two)           Medical



                                                  times           Branch



                                                  daily for           



                                                  10 days.           

 

     amoxicillin       2019- No        [id number] 500mg      Take 1         

  [facility]



     500 mg                                capsule by           ity of



     capsule      00:00: 04:59                          mouth 2           [place]



               00   :00                           (two)           Medical



                                                  times           Branch



                                                  daily for           



                                                  10 days.           

 

     benzonatate            Yes       [id number] 100mg      Take 1          

 [facility]



     (TESSALON      5-07                               capsule by           ity 

of



     PERLES) 100      00:00:                               mouth 3           [place]

as



     mg capsule      00                                 (three)           Medica

l



                                                  times           Branch



                                                  daily.           

 

     benzonatate       2019- No        [id number] 100mg      Take 1         

  [facility]



     (TESSALON      5-07 -29                          capsule by           ity

 of



     PERLES) 100      00:00: 00:00                          mouth 3           [place]

[place]



     mg capsule      00   :00                           (three)           Medica

l



                                                  times           Branch



                                                  daily.           

 

     No known                No                                      [facility]



     medications                                                        ity of



                                                                 [facility]







Immunizations







           Ordered Immunization Filled Immunization Date       Status     Commen

ts   Source



           Name       Name                                        

 

           Meningococcal            2017 Completed             [facility] 

of



           Polysaccharide            00:00:00                         [place] [facility]

[facility]



           (groups A, C, Y and                                             Branc

h



           W-135) conjugate                                             



           vaccine (MCV4P)                                             

 

           Meningococcal            2017 Completed             [facility] 

of



           Polysaccharide            00:00:00                         [place] [facility]

[facility]



           (groups A, C, Y and                                             Branc

h



           W-135) conjugate                                             



           vaccine (MCV4P)                                             

 

           Meningococcal            2017 Completed             [facility] 

of



           Polysaccharide            00:00:00                         [place] [facility]

[facility]



           (groups A, C, Y and                                             Branc

h



           W-135) conjugate                                             



           vaccine (MCV4P)                                             

 

           Meningococcal            2017 Completed             [facility] 

of



           Polysaccharide            00:00:00                         [place] [facility]

[facility]



           (groups A, C, Y and                                             Branc

h



           W-135) conjugate                                             



           vaccine (MCV4P)                                             

 

           Meningococcal            2017 Completed             [facility] 

of



           Polysaccharide            00:00:00                         [place] [facility]

[facility]



           (groups A, C, Y and                                             Branc

h



           W-135) conjugate                                             



           vaccine (MCV4P)                                             

 

           Meningococcal            2017 Completed             [facility] 

of



           Polysaccharide            00:00:00                         [place] [facility]

[facility]



           (groups A, C, Y and                                             Branc

h



           W-135) conjugate                                             



           vaccine (MCV4P)                                             

 

           Meningococcal            2017 Completed             [facility] 

of



           Polysaccharide            00:00:00                         [place] [facility]

[facility]



           (groups A, C, Y and                                             Branc

h



           W-135) conjugate                                             



           vaccine (MCV4P)                                             

 

           Meningococcal            2017 Completed             [facility] 

of



           Polysaccharide            00:00:00                         [place] [facility]

[facility]



           (groups A, C, Y and                                             Branc

h



           W-135) conjugate                                             



           vaccine (MCV4P)                                             

 

           Meningococcal            2017 Completed             [facility] 

of



           Polysaccharide            00:00:00                         [place] [facility]

[facility]



           (groups A, C, Y and                                             Branc

h



           W-135) conjugate                                             



           vaccine (MCV4P)                                             

 

           Meningococcal            2017 Completed             [facility] 

of



           Polysaccharide            00:00:00                         [place] [facility]

[facility]



           (groups A, C, Y and                                             Branc

h



           W-135) conjugate                                             



           vaccine (MCV4P)                                             

 

           Meningococcal            2017 Completed             [facility] 

of



           Polysaccharide            00:00:00                         [place] [facility]

[facility]



           (groups A, C, Y and                                             Branc

h



           W-135) conjugate                                             



           vaccine (MCV4P)                                             

 

           Meningococcal            2017 Completed             [facility] 

of



           Polysaccharide            00:00:00                         [place] [facility]

[facility]



           (groups A, C, Y and                                             Branc

h



           W-135) conjugate                                             



           vaccine (MCV4P)                                             

 

           Meningococcal            2017 Completed             [facility] 

of



           Polysaccharide            00:00:00                         [place] [facility]

[facility]



           (groups A, C, Y and                                             Branc

h



           W-135) conjugate                                             



           vaccine (MCV4P)                                             

 

           Meningococcal            2017 Completed             [facility] 

of



           Polysaccharide            00:00:00                         [place] [facility]

[facility]



           (groups A, C, Y and                                             Branc

h



           W-135) conjugate                                             



           vaccine (MCV4P)                                             

 

           HPV                   2012 Completed             [facility] of



                                 00:00:00                         [facility]

 

           Influenza Virus            2012 Completed             Universit

y of



           Vaccine - Whole            00:00:00                         [place] [facility]

ical



                                                                  [facility]anch

 

           HPV                   2012 Completed             [facility] of



                                 00:00:00                         [facility]

 

           Influenza Virus            2012 Completed             Universit

y of



           Vaccine - Whole            00:00:00                         [facility]

 

           HPV                   2012 Completed             [facility] of



                                 00:00:00                         [facility]

 

           Influenza Virus            2012 Completed             Universit

y of



           Vaccine - Whole            00:00:00                         [facility]

 

           HPV                   2012 Completed             [facility]



                                 00:00:00                         [facility]

 

           Influenza Virus            2012 Completed             Universit

y of



           Vaccine - Whole            00:00:00                         [facility]

 

           HPV                   2012 Completed             [facility]



                                 00:00:00                         [facility]

 

           Influenza Virus            2012 Completed             Universit

y of



           Vaccine - Whole            00:00:00                         [facility]

 

           HPV                   2012 Completed             [facility] of



                                 00:00:00                         [facility]

 

           Influenza Virus            2012 Completed             Universit

y of



           Vaccine - Whole            00:00:00                         [facility]

 

           HPV                   2012 Completed             [facility]



                                 00:00:00                         [facility]

 

           Influenza Virus            2012 Completed             Universit

y of



           Vaccine - Whole            00:00:00                         [facility]

 

           HPV                   2012 Completed             [facility]



                                 00:00:00                         [facility]

 

           Influenza Virus            2012 Completed             Universit

y of



           Vaccine - Whole            00:00:00                         [facility]

 

           HPV                   2012 Completed             [facility]



                                 00:00:00                         [facility]

 

           Influenza Virus            2012 Completed             Universit

y of



           Vaccine - Whole            00:00:00                         [facility]

 

           HPV                   2012 Completed             [facility]



                                 00:00:00                         [facility]

 

           Influenza Virus            2012 Completed             Universit

y of



           Vaccine - Whole            00:00:00                         [facility]

 

           HPV                   2012 Completed             [facility]



                                 00:00:00                         [facility]

 

           Influenza Virus            2012 Completed             Universit

y of



           Vaccine - Whole            00:00:00                         [facility]

 

           HPV                   2012 Completed             [facility]



                                 00:00:00                         [facility]

 

           Influenza Virus            2012 Completed             Universit

y of



           Vaccine - Whole            00:00:00                         [facility]

 

           HPV                   2012 Completed             [facility]



                                 00:00:00                         [facility]

 

           Influenza Virus            2012 Completed             Universit

y of



           Vaccine - Whole            00:00:00                         [facility]

 

           HPV                   2012 Completed             [facility]



                                 00:00:00                         [facility]

 

           HPV                   2012 Completed             [facility]



                                 00:00:00                         [place] [facility]



                                                                  [facility]

 

           HPV                   2012 Completed             [facility]



                                 00:00:00                         [place] [facility]



                                                                  [facility]

 

           HPV                   2012 Completed             [facility]



                                 00:00:00                         [place] [facility]



                                                                  [facility]

 

           HPV                   2012 Completed             [facility]



                                 00:00:00                         [place] [facility]



                                                                  Br[facility]

 

           HPV                   2012 Completed             [facility]



                                 00:00:00                         [place] [facility]



                                                                  [facility]

 

           HPV                   2012 Completed             [facility]



                                 00:00:00                         [place] [facility]



                                                                  [facility]

 

           HPV                   2012 Completed             [facility]



                                 00:00:00                         [place] [facility]



                                                                  Br[facility]

 

           HPV                   2012 Completed             [facility]



                                 00:00:00                         [place] [facility]



                                                                  Br[facility]

 

           HPV                   2012 Completed             [facility]



                                 00:00:00                         [place] [facility]



                                                                  [facility]

 

           HPV                   2012 Completed             [facility]



                                 00:00:00                         [facility]



                                                                  Branch

 

           HPV                   2012 Completed             [facility]



                                 00:00:00                         [facility]

 

           HPV                   2012 Completed             [facility]



                                 00:00:00                         [facility]







Vital Signs







             Vital Name   Observation Time Observation Value Comments     Source

 

             Systolic blood 2022 21:27:00 131 mm[Hg]                Univer

sity of



             pressure                                            [facility]

 

             Diastolic blood 2022 21:27:00 85 mm[Hg]                 Unive

rsity of



             [facility]

 

             Heart rate   2022 21:27:00 78 /min                   [facility]

 

             Body temperature 2022 21:27:00 37.11 Claudia                 [facility]

 

             Respiratory rate 2022 21:27:00 18 /min                   [facility]

 

             Body height  2022 21:27:00 165.1 cm                  [facility]

 

             Body weight  2022 21:27:00 65.182 kg                 [facility]

 

             BMI          2022 21:27:00 23.91 kg/m2               [facility]

 

             Oxygen saturation in 2022 21:27:00 100 /min                  

[facility]



             Arterial blood by                                        [place] [facility]

[facility]



             Pulse oximetry                                        [facility]

 

             Systolic blood 2021 16:34:00 115 mm[Hg]                Univer

sity of



             pressure                                            [facility]

 

             Diastolic blood 2021 16:34:00 68 mm[Hg]                 Unive

rsity of



             [facility]

 

             Heart rate   2021 16:34:00 93 /min                   Universi

ty of



                                                                 [place] [facility]



                                                                 [facility]

 

             Body temperature 2021 16:34:00 36.94 Claudia                 Univ

ersity of



                                                                 [place] [facility]



                                                                 [facility]

 

             Respiratory rate 2021 16:34:00 12 /min                   Univ

ersity of



                                                                 [place] [facility]



                                                                 [facility]

 

             Body height  2021 16:34:00 165.1 cm                  Universi

ty of



                                                                 [place] [facility]



                                                                 [facility]

 

             Body weight  2021 16:34:00 68.04 kg                  Universi

ty of



                                                                 [place] [facility]



                                                                 [facility]

 

             BMI          2021 16:34:00 24.96 kg/m2               Universi

ty of



                                                                 [place] [facility]



                                                                 [facility]

 

             Oxygen saturation in 2021 16:34:00 97 /min                   

[facility]



             Arterial blood by                                        [place] [facility]

[facility]



             Pulse oximetry                                        [facility]

 

             Systolic blood 2021 02:17:00 118 mm[Hg]                Univer

sity of



             pressure                                            [place] [facility]



                                                                 [facility]

 

             Diastolic blood 2021 02:17:00 76 mm[Hg]                 Unive

rsity of



             pressure                                            [place] [facility]



                                                                 [facility]

 

             Heart rate   2021 02:17:00 87 /min                   Universi

ty of



                                                                 [place] [facility]



                                                                 [facility]

 

             Body temperature 2021 02:17:00 37.11 Claudia                 Univ

ersity of



                                                                 [facility]



                                                                 Branch

 

             Respiratory rate 2021 02:17:00 20 /min                   Univ

ersity of



                                                                 [facility]



                                                                 Branch

 

             Body height  2021 02:17:00 165.1 cm                  Universi

ty of



                                                                 [facility]



                                                                 Branch

 

             Body weight  2021 02:17:00 60.782 kg                 Universi

ty of



                                                                 [facility]



                                                                 Branch

 

             BMI          2021 02:17:00 22.30 kg/m2               Universi

ty of



                                                                 [facility]



                                                                 Branch

 

             Oxygen saturation in 2021 02:17:00 94 /min                   

[facility]



             Arterial blood by                                        [place] [facility]

[facility]



             Pulse oximetry                                        Branch

 

             Systolic blood 2020 19:51:00 122 mm[Hg]                Univer

sity of



             pressure                                            [facility]



                                                                 Branch

 

             Diastolic blood 2020 19:51:00 82 mm[Hg]                 Unive

rsity of



             pressure                                            [facility]



                                                                 Branch

 

             Heart rate   2020 19:51:00 88 /min                   Universi

ty of



                                                                 [facility]



                                                                 Branch

 

             Body temperature 2020 19:51:00 37.06 Claudia                 Univ

ersity of



                                                                 [facility]



                                                                 Branch

 

             Respiratory rate 2020 19:51:00 20 /min                   Univ

ersity of



                                                                 [facility]



                                                                 [place]

 

             Body weight  2020 19:51:00 57.607 kg                 Universi

ty of



                                                                 [facility]

 

             Oxygen saturation in 2020 19:51:00 100 /min                  

[facility]



             Arterial blood by                                        [place] [facility]

[place]



             Pulse oximetry                                        Branch

 

             Systolic blood 2019 13:56:00 102 mm[Hg]                Univer

sity of



             pressure                                            [facility]



                                                                 [place]

 

             Diastolic blood 2019 13:56:00 69 mm[Hg]                 Unive

rsity of



             pressure                                            [facility]



                                                                 Branch

 

             Heart rate   2019 13:56:00 81 /min                   Universi

ty of



                                                                 [facility]



                                                                 [place]

 

             Body temperature 2019 13:56:00 37 Claudia                    Univ

ersity of



                                                                 [facility]



                                                                 [place]

 

             Body weight  2019 13:56:00 55.792 kg                 Universi

ty of



                                                                 [facility]



                                                                 [place]

 

             Oxygen saturation in 2019 13:56:00 98 /min                   

[facility] of



             Arterial blood by                                        [place] [facility]

[facility]



             Pulse oximetry                                        Branch

 

             Systolic blood 2019 14:06:00 103 mm[Hg]                Univer

sity of



             pressure                                            [facility]



                                                                 Branch

 

             Diastolic blood 2019 14:06:00 68 mm[Hg]                 Unive

rsity of



             pressure                                            [facility]

 

             Heart rate   2019 14:06:00 94 /min                   Universi

ty of



                                                                 [facility]



                                                                 [place]

 

             Body temperature 2019 14:06:00 39 Claudia                    Univ

ersity of



                                                                 [facility]



                                                                 [place]

 

             Body weight  2019 14:06:00 59.421 kg                 Universi

ty of



                                                                 [facility]



                                                                 [place]

 

             Oxygen saturation in 2019 14:06:00 99 /min                   

University of



             Arterial blood by                                        [place] [facility]

[facility]



             Pulse oximetry                                        Branch







Procedures







                Procedure       Date / Time     Performing Clinician Source



                                Performed                       

 

                [id number]         2021-10-02 00:00:00 KARMA.04        [facility]

 

                [id number]         2021-10-02 00:00:00 KARMA.04        [facility]

 

                ASSIGNMENT OF BENEFITS 2021 16:25:40 Doctor Unassigned, [facility]

[facility]



                                                [place]         [facility]

 

                CONSENT/REFUSAL FOR 2021 02:06:25 Doctor Unassigned, Unive

[facility]



                DIAGNOSIS AND TREATMENT                 [place]         Medical 

Branch

 

                XR LUMBAR SPINE 3 VW 2020 21:17:16 JAYNE Ross [facility]



                                                                Medical [place]

 

                XR SPINE THORACIC 2  2020 21:17:16 [facility]

[facility]

 

                XR CERVICAL SPINE 3  2020 21:17:16 [facility]

[facility]

 

                POCT PREGNANCY TEST 2020 20:35:00 JAYNE Ross [facility]

 

                NOTICE OF PRIVACY 2020 20:10:25 Doctor Unassigned, [facility]



                PRACTICES                       [place]         Medical Branch

 

                CONSENT/REFUSAL FOR 2020 20:10:01 [facility]rill[facility]

[facility]



                DIAGNOSIS AND TREATMENT                 [place]         Medical 

[place]

 

                NOTICE OF PRIVACY 2020 19:26:25 Doctor [facility]ass[facility]ancy, [facility]                       [place]         Medical Branch

 

                CONSENT/REFUSAL FOR 2020 19:26:06 Doctor Unasherrill [facility]



                DIAGNOSIS AND TREATMENT                 [place]         Medical 

[place]

 

                ASSIGNMENT OF BENEFITS 2019 13:40:42 Doctor Unasherrill, [facility]



                                                [place]         Medical Branch

 

                POCT RAPID STREP SCREEN 2019 00:00:00 Lucero Jonas  [facility]



                FOR GROUP A                                     Medical Branch

 

                NO SHOW OR MISSED 2019 13:58:16 Doctor Unasherrill, [facility]



                APPOINTMENT POLICY                 No Name         Medical [facility]



                ACKNOWLEDGEMENT                                 

 

                POCT RAPID STREP SCREEN 2019 00:00:00 Emma Pierce [facility]

[facility] [place]



                FOR GROUP A                                     Medical Branch







Encounters







        Start   End     Encounter Admission Attending Care    Care    Encounter 

Source



        Date/Time Date/Time Type    Type    Clinicians Facility Department ID   

   

 

        2021-10-31         Emergency                 [facility]    [id number] 

Univers



        17:57:14                                                         ity [facility]

 

        2021-10-28         Emergency                 [facility]    [id number] 

Univers



        23:52:22                                                         [facility]

 

        2021-10-09         [facility]         [id number] 

[facility]



        06:00:00                         [facility]      [facility]



                                                                        [facility]

 

        2022 Urgent          Mireille Jackson [facility]    1.2.840.114 [id number]

[id number] Univers



        15:20:00 15:40:00 Care            Unknown, [facility]  [id number]

         ity of



                                                [place] [id number]         [place]

as



                                                RAHUL?BLEA [phone]         [facility]    [address]             [place]



                                                MEDICAL                 



                                                OFFICE                  



                                                BUILDING                 

 

        2022 Outpatient R       DANIELA   [facility]    [id number]

[id number] Univers



        15:20:00 15:33:51                 MIREILLE                          ity of



                                                                        [facility]

 

        2021-10-01 2021-10-04 Inpatient TRUPTI Muse   [facility]   SUNDAY    [id number]

[id number] [facility]



        08:10:00 13:03:00                 Mouna                 00      Woman'

s



                                                                        Hospita



                                                                        l of



                                                                        [place]

 

        2021 Emergency MICKY Muse   [facility]   SUNDAY    [id number]

[id number] [facility]



        18:21:00 20:50:00                 Mouna                 47      Woman'

s



                                                                        Hospita



                                                                        l of



                                                                        [place]

 

        2021 [facility]ill          [facility]    1.2.840.114 [id number]

62 Univers



        00:00:00 00:00:00                 [facility]  [id number]         it

y of



                                                [place] [id number]         [place]

as



                                                Pro[place] [phone]         [facility]     [address]             [place]



                                                Office                  



                                                Building                 



                                                One                     

 

        2021 Urgent          Provider, [facility] Urgent Care [facility]    

1.2.840.114 

[id number]                                Univers



        11:27:55 11:47:55 Care            Lucero Jonas [facility]  [id number]    

     ity of



                                                [place] [id number]         [place]

as



                                                Pro[place] [phone]         [facility]



                                                nal     [address]             [place]



                                                Office                  



                                                Building                 



                                                One                     

 

        2021 Outpatient R               [facility]    [id number]

195 Univers



        11:20:00 11:20:00                                                 ity of



                                                                        [facility]

 

        2021 Orders          Doctor MARQUEZ    1.2.840.114 [id number]

13 Univers



        00:00:00 00:00:00 Only            Unassigned, OSMAN   [id number]       

  ity of



                                        [place] [facility] [id number]         [place]

as



                                                        [phone]         [address]

 

        2021 Emergency         Ibmarcunzenia, [facility]    1.2.840.114 [id number]

[id number] Univers



        21:20:00 22:49:00                 Valeriedanika DEAN Kyra [id number]        

 ity of



                                                [place] [id number]         [facility]  [phone]         [address]

 

        2021 Orders          Doctor MARQUEZ    1.2.840.114 [id number]

62 Univers



        00:00:00 00:00:00 Only            Unassigned, OSMAN   [id number]       

  ity of



                                        [facility] [id number]         [place]

as



                                                        [phone]         [address]

 

        2020 Emergency         CodyJAYNE [facility]    1.2.840.114 [id number]

[id number] Univers



        15:38:52 18:06:00                 Erintonya Rae [id number]         i

ty of



                                                [place] [id number]         [facility]  [phone]         [address]

 

        2020 Emergency X       CODY, K [facility]    ERT     [id number]

[id number] Univers



        15:38:52 18:06:00                                                 ity of



                                                                        [facility]

 

        2020 Emergency         [facility]    [id number]

[id number] Univers



        23:11:06 23:37:00                 Nancy Rae 350.1.13.10         

ity of



                                                [place] [id number]         [facility]  [phone]         [address]

 

        2019-10-08 2019-10-08 Emergency X       [facility]    [facility]     [id number]

[id number] Univers



        12:52:58 18:15:00                 SHINTA                          ity of



                                                                        [facility]

 

        2019 Office          Sumi  [facility]    1.2.840.114 [id number]

[id number] Univers



        08:41:52 09:31:22 Visit           [facility]  350.1.13.10         it

y of



                                                [place] [id number]         [place]

as



                                                [place] [phone]         [facility]

[facility]     [address]             [place]



                                                Office                  



                                                Building                 



                                                One                     

 

        2019 Orders          Doctor  JIMMY    1.2.840.114 [id number]

75 Univers



        00:00:00 00:00:00 Only            Unassigned, OSMAN   350.1.13.10       

  ity of



                                        [place] [facility] [id number]         [place]

as



                                                        [phone]         [address]

 

        2019 Office          Kari, [facility]    1.2.840.114 [id number]

17 Univers



        09:00:31 09:31:00 Visit           [facility]  350.1.13.10         i

ty of



                                                [place] [id number]         [place]

as



                                                [place] [phone]         [address]



                                                Office                  



                                                Building                 



                                                One                     

 

        2019 Orders          Doctor  JIMMY    1.2.840.114 [id number]

06 Univers



        00:00:00 00:00:00 Only            Unassigned, OSMAN   350.1.13.10       

  ity of



                                        [place] [facility] [id number]         [place]

as



                                                        [phone]         [address]

 

        2019 Letter          Pcp,    [facility]    1.2.840.114 [id number]

77 Univers



        00:00:00 00:00:00 (Out)           Patient Health  350.1.13.10         it

y of



                                        Does Not [place] [id number]         Te

[place]



                                        Have A  Professio [phone]         [address]                  



                                                Building                 



                                                One                     







Results







           Test Description Test Time  Test Comments Results    Result Comments 

Source









                    HGB HCT             2021-10-03 07:03:00 









                      Test Item  Value      Reference Range Interpretation Comme

nts









             HEMOGLOBIN (test code = HGB) 9.9 g/dL     10.1-13.8    L           

 

 

             HEMATOCRIT (test code = HCT) 30.9 %       32.5-41.8    L           

 



CAPILLARY BLOOD [id number] 05:44:00





             Test Item    Value        Reference Range Interpretation Comments

 

             CAPILLARY BLOOD GAS PH (test code 6.973        7.35-7.45    LL     

      



             = PHC)                                              

 

             CAPILLARY BLOOD GAS PCO2 (test 95.3 mmHg                           

   



             code = PCO2C)                                        

 

             CBG HCO3 (test code = HCO3C) 21.6 meq/L                            

 

 

             CBG BASE EXCESS (test code = BEC) -12.4                            

      

 

             CAPILLARY BLOOD GAS TYPE (test CBLA                                

   



             code = TYPEC)                                        

 

             CAPILLARY BLOOD GAS FIO2 (test 21.0 %                              

   



             code = FIO2C)                                        



CAPILLARY BLOOD [id number] 05:43:00





             Test Item    Value        Reference Range Interpretation Comments

 

             CAPILLARY BLOOD GAS PH (test code 7.260        7.35-7.45    L      

      



             = PHC)                                              

 

             CAPILLARY BLOOD GAS PCO2 (test 53.3 mmHg                           

   



             code = PCO2C)                                        

 

             CAPILLARY BLOOD GAS PO2 (test code 19.4 mmHg                       

       



             = PO2C)                                             

 

             CBG HCO3 (test code = HCO3C) 23.4 meq/L                            

 

 

             CBG BASE EXCESS (test code = BEC) -4.3                             

      

 

             CBG O2 SATURATION (test code = 24.2 %                              

   



             SATC)                                               

 

             CAPILLARY BLOOD GAS TYPE (test CBLV                                

   



             code = TYPEC)                                        

 

             CAPILLARY BLOOD GAS FIO2 (test 21.0 %                              

   



             code = FIO2C)                                        



AG HEPATITIS B SURFACE2021-10-01 11:00:00





             Test Item    Value        Reference Range Interpretation Comments

 

             AG HEPATITIS B SURFACE (test code NONREACTIVE  NONREACTIVE         

      



             = HBSAG)                                            



AB HEPATITIS C PROFILE2021-10-01 11:00:00





             Test Item    Value        Reference Range Interpretation Comments

 

             AB HEPATITIS C (test code = NONREACTIVE  NONREACTIVE               



             HCVAB)                                              

 

             SIGNAL TO CUTOFF (test code = <0.02        <0.80        N          

  



             CUTOFF)                                             



AB TREPONEMA2021-10-01 11:00:00





             Test Item    Value        Reference Range Interpretation Comments

 

             AB TREPONEMA (test code = TREPAB) NONREACTIVE  NONREACTIVE         

      



AB HIV 1 22021-10-01 11:00:00





             Test Item    Value        Reference Range Interpretation Comments

 

             AB HIV 1 2 (test NONREACTIVE  NONREACTIVE               [facility]



             code = [id number])                                        4th Gen HIV 

Ag/Ab Combo



                                                                 Screen



COVID 19 Asymptomatic IH AG2021-10-01 09:59:00





             Test Item    Value        Reference Range Interpretation Comments

 

             COVID 19     NEGATIVE     NEGATIVE                  This test has b

een



             Asymptomatic IH AG                                        authorize

d only for the



             (test code =                                        detection ofpro

teins from



             COVNONPUIAG)                                        SARS-CoV-2, not

 for any



                                                                 other viruses



                                                                 orpathogens. Ne

gative



                                                                 results should 

be treated



                                                                 as presumptive



                                                                 andconfirmed wi

th a



                                                                 molecular assay

, if



                                                                 necessary for



                                                                 patientmanageme

nt.



                                                                 Negative result

s do not



                                                                 rule out COVID-

19



                                                                 andshould not b

e used as



                                                                 the sole basis 

for



                                                                 treatment orpat

ient



                                                                 management deci

sions,



                                                                 including infec

tion



                                                                 controldecision

s.



                                                                 Negative result

s should



                                                                 be considered i

n



                                                                 thecontext of a

 patient's



                                                                 recent exposure

s, history



                                                                 and thepresence

 of



                                                                 clinical signs 

and



                                                                 symptoms consis

tent



                                                                 withCOVID-19. T

his test



                                                                 has not been FD

A cleared



                                                                 or approved; th

e test



                                                                 hasbeen authorhiedy montez by FDA



                                                                 under an [facility]

ncy Use



                                                                 Authorization(E

UA) for



                                                                 use by laborato

maddy



                                                                 certified under

 the CLIA



                                                                 thatmeet the re

quirements



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

## 2023-01-25 NOTE — ER
Nurse's Notes                                                                                     

 [facility]                                                                 

Name: Tram Holden                                                                            

Age: 22 yrs                                                                                       

Sex: Female                                                                                       

: 2000                                                                                   

MRN: [id number]                                                                                   

Arrival Date: 2023                                                                          

Time: 09:48                                                                                       

Account#: [id number]                                                                            

Bed 10                                                                                            

Private MD:                                                                                       

Diagnosis: Encounter for change or removal of nonsurgical wound dressing                          

                                                                                                  

Presentation:                                                                                     

                                                                                             

09:56 Chief complaint: Patient states: needs packing removed from wound on her back.          iw  

      Coronavirus screen: At this time, the client does not indicate any symptoms associated      

      with coronavirus-19. Ebola Screen: Patient negative for fever greater than or equal to      

      101.5 degrees Fahrenheit, and additional compatible Ebola Virus Disease symptoms            

      Patient denies exposure to infectious person. Patient denies travel to an                   

      Ebola-affected area in the 21 days before illness onset. No symptoms or risks               

      identified at this time. Initial Sepsis Screen: Does the patient meet any 2 criteria?       

      No. Patient's initial sepsis screen is negative. Does the patient have a suspected          

      source of infection? No. Patient's initial sepsis screen is negative. Risk Assessment:      

      Do you want to hurt yourself or someone else? Patient reports no desire to harm self or     

      others. Onset of symptoms was 2023.                                             

09:56 Method Of Arrival: Ambulatory                                                           iw  

09:56 Acuity: IVAN 4                                                                             

                                                                                                  

Triage Assessment:                                                                                

10:27 General: Appears in no apparent distress. uncomfortable, Behavior is calm, cooperative, jl7 

      appropriate for age. Pain: Denies pain.                                                     

                                                                                                  

OB/GYN:                                                                                           

10:27 LMP N/A - Birth control method                                                          jl7 

                                                                                                  

Historical:                                                                                       

- Allergies:                                                                                      

10:27 No Known Allergies;                                                                     jl7 

- Home Meds:                                                                                      

10:27 None [Active];                                                                          jl7 

- PMHx:                                                                                           

10:27 None;                                                                                   jl7 

- PSHx:                                                                                           

10:27  section; Cholecystectomy; Tonsillectomy;                                       jl7 

                                                                                                  

- Immunization history:: Adult Immunizations unknown.                                             

- Social history:: Smoking status: unknown.                                                       

                                                                                                  

                                                                                                  

Vital Signs:                                                                                      

09:56  / 67; Pulse 81; Resp 16; Temp 98.2; Pulse Ox 100% on R/A;                        iw  

                                                                                                  

ED Course:                                                                                        

09:48 Patient arrived in ED.                                                                  am2 

09:51 Medhat Rothman PA is PHCP.                                                              [facility] 

09:51 Sander Santos MD is Attending Physician.                                            luis 

09:57 Triage completed.                                                                       iw  

10:27 Arm band placed on right wrist.                                                         jl7 

10:28 No provider procedures requiring assistance completed. Patient did not have IV access   jl7 

      during this emergency room visit.                                                           

                                                                                                  

Administered Medications:                                                                         

No medications were administered                                                                  

                                                                                                  

                                                                                                  

Outcome:                                                                                          

10:10 Discharge ordered by MD.                                                                luis 

10:28 Discharged to home ambulatory.                                                          jl7 

10:28 Condition: stable                                                                           

10:28 Discharge instructions given to patient, Instructed on discharge instructions, follow       

      up and referral plans. Demonstrated understanding of instructions, follow-up care.          

10:28 Patient left the ED.                                                                    jl7 

                                                                                                  

Signatures:                                                                                       

Medhat Rothman PA PA jmm Williams, Irene, RN                     RN   iw                                                   

Madhuri Anaya RN                        RN   jl7                                                  

Katia Melchor am2                                                  

                                                                                                  

**************************************************************************************************

## 2023-07-14 ENCOUNTER — HOSPITAL ENCOUNTER (EMERGENCY)
Dept: HOSPITAL 97 - ER | Age: 23
LOS: 1 days | Discharge: HOME | End: 2023-07-15
Payer: COMMERCIAL

## 2023-07-14 DIAGNOSIS — R10.9: ICD-10-CM

## 2023-07-14 DIAGNOSIS — R11.2: Primary | ICD-10-CM

## 2023-07-14 LAB
ALBUMIN SERPL BCP-MCNC: 4.4 G/DL (ref 3.4–5)
ALP SERPL-CCNC: 61 U/L (ref 45–117)
ALT SERPL W P-5'-P-CCNC: 48 U/L (ref 13–56)
AST SERPL W P-5'-P-CCNC: 30 U/L (ref 15–37)
BUN BLD-MCNC: 12 MG/DL (ref 7–18)
GLUCOSE SERPLBLD-MCNC: 104 MG/DL (ref 74–106)
HCT VFR BLD CALC: 43.5 % (ref 36–45)
LIPASE SERPL-CCNC: 20 U/L (ref 13–75)
LYMPHOCYTES # SPEC AUTO: 0.6 K/UL (ref 0.7–4.9)
MAGNESIUM SERPL-MCNC: 2.1 MG/DL (ref 1.6–2.4)
MCV RBC: 89.2 FL (ref 80–100)
PMV BLD: 9.6 FL (ref 7.6–11.3)
POTASSIUM SERPL-SCNC: 3.8 MEQ/L (ref 3.5–5.1)
RBC # BLD: 4.87 M/UL (ref 3.86–4.86)

## 2023-07-14 PROCEDURE — 83735 ASSAY OF MAGNESIUM: CPT

## 2023-07-14 PROCEDURE — 96375 TX/PRO/DX INJ NEW DRUG ADDON: CPT

## 2023-07-14 PROCEDURE — 80053 COMPREHEN METABOLIC PANEL: CPT

## 2023-07-14 PROCEDURE — 83690 ASSAY OF LIPASE: CPT

## 2023-07-14 PROCEDURE — 85025 COMPLETE CBC W/AUTO DIFF WBC: CPT

## 2023-07-14 PROCEDURE — 96361 HYDRATE IV INFUSION ADD-ON: CPT

## 2023-07-14 PROCEDURE — 81001 URINALYSIS AUTO W/SCOPE: CPT

## 2023-07-14 PROCEDURE — 36415 COLL VENOUS BLD VENIPUNCTURE: CPT

## 2023-07-14 PROCEDURE — 96374 THER/PROPH/DIAG INJ IV PUSH: CPT

## 2023-07-14 PROCEDURE — 99284 EMERGENCY DEPT VISIT MOD MDM: CPT

## 2023-07-14 PROCEDURE — 81025 URINE PREGNANCY TEST: CPT

## 2023-07-14 NOTE — ER
Nurse's Notes                                                                                     

 [facility]                                                                 

Name: Tram Holden                                                                            

Age: 23 yrs                                                                                       

Sex: Female                                                                                       

: 2000                                                                                   

MRN: [id number]                                                                                   

Arrival Date: 2023                                                                          

Time: 19:58                                                                                       

Account#: [id number]                                                                            

Bed 4                                                                                             

Private MD:                                                                                       

Diagnosis: Nausea with vomiting, unspecified;Abdominal pain, unspecified                          

                                                                                                  

Presentation:                                                                                     

                                                                                             

21:46 Chief complaint: Patient states: vomiting since this AM. cant keep nothing down and i   lg3 

      feel weak with body aches. Coronavirus screen: Client denies travel out of the [place] in      

      the last 14 days. At this time, the client does not indicate any symptoms associated        

      with coronavirus-19. Ebola Screen: No symptoms or risks identified at this time.            

      Initial Sepsis Screen: Does the patient meet any 2 criteria? No. Patient's initial          

      sepsis screen is negative. Does the patient have a suspected source of infection? No.       

      Patient's initial sepsis screen is negative. Risk Assessment: Do you want to hurt           

      yourself or someone else? Patient reports no desire to harm self or others. Onset of        

      symptoms was 2023.                                                                 

21:46 Method Of Arrival: Ambulatory                                                           lg3 

21:46 Acuity: IVAN 3                                                                           lg3 

                                                                                                  

Triage Assessment:                                                                                

21:48 General: Appears in no apparent distress. uncomfortable, Behavior is calm, cooperative. lg3 

      Pain: Complains of pain in abdomen. EENT: No deficits noted. No signs and/or symptoms       

      were reported regarding the EENT system. Neuro: No deficits noted. Teresa                 

      Agitation-Sedation Scale (RASS): 0 - Alert and Calm Level of Consciousness is awake,        

      alert, obeys commands, Oriented to person, place, time, situation. Cardiovascular: No       

      deficits noted. Denies chest pain, shortness of breath. Respiratory: No deficits noted.     

      Airway is patent Respiratory effort is even, unlabored, Respiratory pattern is regular,     

      symmetrical. GI: Abdomen is flat, non-distended, Reports lower abdominal pain, upper        

      abdominal pain, cramping, intolerance of fluids, intolerance of food, nausea, vomiting.     

      : No deficits noted. No signs and/or symptoms were reported regarding the                 

      genitourinary system. Derm: No deficits noted. No signs and/or symptoms reported            

      regarding the dermatologic system. Skin is intact, is healthy with good turgor, Skin is     

      dry, Skin is normal, Skin temperature is warm. Musculoskeletal: No deficits noted.          

      Circulation, motion, and sensation intact. Range of motion: intact in all extremities,      

      Reports generalized weakness.                                                               

                                                                                                  

OB/GYN:                                                                                           

21:48 LMP 2023                                                                            lg3 

                                                                                                  

Historical:                                                                                       

- Allergies:                                                                                      

21:48 No Known Allergies;                                                                     lg3 

- Home Meds:                                                                                      

21:48 None [Active];                                                                          lg3 

- PMHx:                                                                                           

21:48 None;                                                                                   lg3 

- PSHx:                                                                                           

21:48  section; Cholecystectomy; Tonsillectomy;                                       lg3 

                                                                                                  

- Immunization history:: Adult Immunizations up to date, Client reports receiving the             

  2nd dose of the Covid vaccine.                                                                  

- Social history:: Smoking status: Patient denies any tobacco usage or history of.                

  Patient/guardian denies using alcohol, street drugs.                                            

                                                                                                  

                                                                                                  

Screenin:10 [facility] ED Fall Risk Assessment (Adult) History of falling in the last 3 months,       mb9 

      including since admission No falls in past 3 months (0 pts) Confusion or Disorientation     

      No (0 pts) Intoxicated or Sedated No (0 pts) Impaired Gait No (0 pts) Mobility Assist       

      Device Used No (0 pt) Altered Elimination No (0 pt) Score/Fall Risk Level 0 - 2 = Low       

      Risk Oriented to surroundings, Maintained a safe environment, Educated pt \T\ family on     

      fall prevention, incl call for assistance when getting out of bed. Abuse screen: Denies     

      threats or abuse. Nutritional screening: No deficits noted. Tuberculosis screening: No      

      symptoms or risk factors identified.                                                        

                                                                                                  

Assessment:                                                                                       

22:09 General: Appears uncomfortable, Behavior is calm, cooperative. Pain: Complains of pain  mb9 

      in abdomen Pain does not radiate. Pain currently is 6 out of 10 on a pain scale.            

      Quality of pain is described as throbbing, Pain began suddenly. Neuro: Teresa             

      Agitation-Sedation Scale (RASS): 0 - Alert and Calm Level of Consciousness is awake,        

      alert, obeys commands, Oriented to person, place, time, situation, Appropriate for age.     

      Cardiovascular: Patient's skin is warm and dry. Respiratory: Airway is patent               

      Respiratory effort is even, unlabored, Respiratory pattern is regular, symmetrical. GI:     

      Abdomen is flat, non-distended, Bowel sounds present X 4 quads. Abd is soft X 4 quads       

      Abdomen is tender to palpation in epigastric area Reports nausea, vomiting. : Urine       

      is cloudy. Derm: Skin is pink, warm \T\ dry. Musculoskeletal: Range of motion: intact in    

      all extremities.                                                                            

                                                                                                  

Vital Signs:                                                                                      

21:46  / 74; Pulse 84; Resp 17 S; Temp 100.1(O); Pulse Ox 100% on R/A; Weight 65.32 kg  lg3 

      (R); Height 5 ft. 5 in. ;                                                                   

21:46 Body Mass Index 23.96 (65.32 kg, 165.1 cm)                                              lg3 

                                                                                                  

ED Course:                                                                                        

20:01 Patient arrived in ED.                                                                  jj6 

20:04 Keerthi Harry MD is Attending Physician.                                           sd2 

21:48 Triage completed.                                                                       lg3 

21:48 Arm band placed on right wrist.                                                         lg3 

21:51 Lisa Evans, RN is Primary Nurse.                                               mb9 

22:10 Placed in gown. Bed in low position. Call light in reach. Side rails up X 1. Client     mb9 

      placed on continuous cardiac and pulse oximetry monitoring. NIBP monitoring applied.        

22:10 Inserted saline lock: 20 gauge in right antecubital area, using aseptic technique.      mb9 

22:10 Magnesium Sent.                                                                         mb9 

22:10 Pregnancy Test, Urine Sent.                                                             mb9 

22:10 Urinalysis w/ reflexes Sent.                                                            mb9 

22:10 CMP Sent.                                                                               mb9 

22:10 Lipase Sent.                                                                            mb9 

22:10 CBC with Diff Sent.                                                                     mb9 

22:10 No provider procedures requiring assistance completed.                                  mb9 

07/15                                                                                             

00:04 IV discontinued, intact, bleeding controlled, No redness/swelling at site. Pressure     mb9 

      dressing applied.                                                                           

                                                                                                  

Administered Medications:                                                                         

                                                                                             

22:05 Drug: NS 0.9% IV 1000 ml Route: IV; Rate: 1 bolus; Site: right antecubital;             mb9 

23:05 Follow up: Response: No adverse reaction; IV Status: Completed infusion                 mb9 

22:05 Drug: Ondansetron IVP 4 mg Route: IVP; Site: right antecubital;                         mb9 

23:05 Follow up: Response: No adverse reaction                                                mb9 

22:47 Drug: Famotidine IVP 20 mg Route: IVP; Site: right antecubital;                         mb9 

23:04 Follow up: Response: No adverse reaction                                                mb9 

                                                                                                  

                                                                                                  

Medication:                                                                                       

22:11 VIS not applicable for this client.                                                     mb9 

                                                                                                  

Outcome:                                                                                          

23:21 Discharge ordered by MD.                                                                sd2 

07/15                                                                                             

00:04 Discharged to home ambulatory.                                                          mb9 

      Condition: stable                                                                           

      Discharge instructions given to patient, Instructed on discharge instructions, follow       

      up and referral plans. Demonstrated understanding of instructions, follow-up care,          

      medications, Prescriptions given X 2.                                                       

00:04 Patient left the ED.                                                                    mb9 

                                                                                                  

Signatures:                                                                                       

Addie Philippe, RN                       RN   lg3                                                  

Kerrie Arteaga6                                                  

Keerthi Harry MD MD   sd2                                                  

Lisa Evans RN                 RN   mb9                                                  

                                                                                                  

**************************************************************************************************

## 2023-07-14 NOTE — EDPHYS
Physician Documentation                                                                           

 [facility]                                                                 

Name: Tram Holden                                                                            

Age: 23 yrs                                                                                       

Sex: Female                                                                                       

: 2000                                                                                   

MRN: [id number]                                                                                   

Arrival Date: 2023                                                                          

Time: 19:58                                                                                       

Account#: [id number]                                                                            

Bed 4                                                                                             

Private MD:                                                                                       

ED Physician Keerthi Harry                                                                    

HPI:                                                                                              

                                                                                             

23:18 This 23 yrs old  Female presents to ER via Ambulatory with complaints of        sd2 

      Nausea/Vomiting, General Weakness.                                                          

23:18 22 yo F presents with CC of n/v that started earlier today and has not been able to     sd2 

      keep anything down today. Has had diarrhea x2 this evening as well. Denies fever or         

      urinary symptoms. States there is a chance of pregnancy. Tried taking Dramamine today       

      with no relief. .                                                                           

                                                                                                  

OB/GYN:                                                                                           

21:48 LMP 2023                                                                            lg3 

                                                                                                  

Historical:                                                                                       

- Allergies:                                                                                      

21:48 No Known Allergies;                                                                     lg3 

- Home Meds:                                                                                      

21:48 None [Active];                                                                          lg3 

- PMHx:                                                                                           

21:48 None;                                                                                   lg3 

- PSHx:                                                                                           

21:48  section; Cholecystectomy; Tonsillectomy;                                       lg3 

                                                                                                  

- Immunization history:: Adult Immunizations up to date, Client reports receiving the             

  2nd dose of the Covid vaccine.                                                                  

- Social history:: Smoking status: Patient denies any tobacco usage or history of.                

  Patient/guardian denies using alcohol, street drugs.                                            

                                                                                                  

                                                                                                  

ROS:                                                                                              

23:18 Constitutional: Negative for fever, chills, and weight loss, Eyes: Negative for injury, sd2 

      pain, redness, and discharge, Cardiovascular: Negative for chest pain, palpitations,        

      and edema, Respiratory: Negative for shortness of breath, cough, wheezing. Abdomen/GI:      

      Positive for abdominal cramping, nausea, vomiting, diarrhea. : Negative for dysuria,      

      urinary frequency, hesitancy, urgency and hematuria. MS/Extremity: Negative for injury      

      and deformity, Skin: Negative for injury, rash, and discoloration, Neuro: Negative for      

      headache, numbness and tingling.                                                            

                                                                                                  

Exam:                                                                                             

23:18 Constitutional:  This is a well developed, well nourished patient who is awake, alert,  sd2 

      and in no acute distress. Head/Face:  Normocephalic, atraumatic. Eyes:  EOMI, normal        

      conjunctiva bilaterally Chest/axilla:  Normal chest wall appearance and motion.             

      Nontender with no deformity.   Cardiovascular:  Regular rate and rhythm with a normal       

      S1 and S2.  No gallops, murmurs, or rubs.  2+ distal pulses. Respiratory:  Lungs have       

      equal breath sounds bilaterally, clear to auscultation and percussion.  No rales,           

      rhonchi or wheezes noted.  No increased work of breathing, no retractions or nasal          

      flaring. Abdomen/GI:  Soft, non-tender, with normal bowel sounds. No guarding or            

      rebound.  No evidence of tenderness throughout. Skin:  Warm, dry with normal turgor.        

      Normal color with no rashes, no lesions, and no evidence of cellulitis. MS/ Extremity:      

      Pulses equal, no cyanosis.  Neurovascular intact.  Full, normal range of motion.            

      Ambulatory without difficulty. Psych:  Awake, alert, with orientation to person, place      

      and time.  Behavior, mood, and affect are within normal limits.                             

                                                                                                  

Vital Signs:                                                                                      

21:46  / 74; Pulse 84; Resp 17 S; Temp 100.1(O); Pulse Ox 100% on R/A; Weight 65.32 kg  lg3 

      (R); Height 5 ft. 5 in. ;                                                                   

21:46 Body Mass Index 23.96 (65.32 kg, 165.1 cm)                                              lg3 

                                                                                                  

MDM:                                                                                              

21:51 Patient medically screened.                                                             sd2 

23:18 Differential diagnosis: Gastritis, cholecystitis, pancreatitis, SBO, diverticulitis,    sd2 

      kidney stone, appendicitis, UTI, dehydration, electrolyte abnormality among others.         

      Data reviewed: vital signs, nurses notes, lab test result(s). I considered the              

      following discharge prescriptions or medication management in the emergency department      

      Medications were administered in the Emergency Department. See MAR. Test considered but     

      Not performed: CT: Not indicated with benign abdominal exam and labs. Counseling: I had     

      a detailed discussion with the patient and/or guardian regarding: the historical            

      points, exam findings, and any diagnostic results supporting the discharge/admit            

      diagnosis, lab results, the need for outpatient follow up, to return to the emergency       

      department if symptoms worsen or persist or if there are any questions or concerns that     

      arise at home. ED course: Labs reviewed with no significant abnormalities at this time.     

      The patient is feeling improved and able to tolerate p.o. following the meds. Her           

      abdominal exam is benign and her gallbladder has previously been removed. Patient was       

      advised of continued supportive care at home for her symptoms as well as strict return      

      precautions to return for CT scan or further imaging if her symptoms do worsen. She is      

      comfortable with plan for discharge and outpatient follow-up and will be discharged         

      home with oral Zofran and Bentyl. Also advised pepcid for home use as needed. .             

                                                                                                  

                                                                                             

21:51 Order name: CBC with Diff; Complete Time: 22:35                                         sd2 

                                                                                             

21:51 Order name: CMP; Complete Time: 22:35                                                                                                                                                

21:51 Order name: Lipase; Complete Time: 22:35                                                                                                                                             

21:51 Order name: Magnesium; Complete Time: 22:35                                                                                                                                          

21:51 Order name: Urinalysis w/ reflexes; Complete Time: 22:35                                                                                                                             

21:51 Order name: Pregnancy Test, Urine; Complete Time: 22:35                                                                                                                              

22:36 Order name: PO challenge; Complete Time: 22:55                                          sd2 

                                                                                                  

Administered Medications:                                                                         

22:05 Drug: NS 0.9% IV 1000 ml Route: IV; Rate: 1 bolus; Site: right antecubital;             mb9 

23:05 Follow up: Response: No adverse reaction; IV Status: Completed infusion                 mb9 

22:05 Drug: Ondansetron IVP 4 mg Route: IVP; Site: right antecubital;                         mb9 

23:05 Follow up: Response: No adverse reaction                                                mb9 

22:47 Drug: Famotidine IVP 20 mg Route: IVP; Site: right antecubital;                         mb9 

23:04 Follow up: Response: No adverse reaction                                                mb9 

                                                                                                  

                                                                                                  

Disposition Summary:                                                                              

23 23:21                                                                                    

Discharge Ordered                                                                                 

      Location: Home                                                                          sd2 

      Problem: new                                                                            sd2 

      Symptoms: have improved                                                                 sd2 

      Condition: Stable                                                                       sd2 

      Diagnosis                                                                                   

        - Nausea with vomiting, unspecified                                                   sd2 

        - Abdominal pain, unspecified                                                         sd2 

      Followup:                                                                               sd2 

        - With: Private Physician                                                                  

        - When: 2 - 3 days                                                                         

        - Reason: Recheck today's complaints, Continuance of care, Re-evaluation by your           

      physician                                                                                   

      Discharge Instructions:                                                                     

        - Discharge Summary Sheet                                                             sd2 

        - Abdominal Pain, Adult                                                               sd2 

        - Nausea and Vomiting, Adult                                                          sd2 

      Forms:                                                                                      

        - Medication Reconciliation Form                                                      sd2 

        - Thank You Letter                                                                    sd2 

        - Antibiotic Education                                                                sd2 

        - Prescription Opioid Use                                                             sd2 

        - Patient Portal Instructions                                                         sd2 

      Prescriptions:                                                                              

        - Zofran 4 mg Oral Tablet                                                                  

            - take 1 tablet by ORAL route every 6 hours As needed; 15 tablet; Refills: 0,     sd2 

      Product Selection Permitted                                                                 

        - dicyclomine 20 mg Oral Tablet                                                            

            - take 1 tablet by ORAL route every 6 hours As needed Take as needed for          sd2 

      abdominal cramping; 15 tablet; Refills: 0, Product Selection Permitted                      

Signatures:                                                                                       

Dispatcher [employer]                           Addie Victoria RN RN   lg3                                                  

Keerthi Harry MD MD   sd2                                                  

Lisa Evans RN RN   mb9                                                  

                                                                                                  

**************************************************************************************************

## 2025-01-21 NOTE — XMS REPORT
Continuity of Care Document

                            Created on:2023



Patient:CHINA BARRY

Sex:Female

:2000

External Reference #:[id number]





Demographics







                          Address                   [address]



                                                    [place] [address]

 

                          Home Phone                [phone]

 

                          Work Phone                [phone]

 

                          Mobile Phone              [phone]

 

                          Email Address             [email or web address]

 

                          Preferred Language        English

 

                          Marital Status            Unknown

 

                          Synagogue Affiliation     Unknown

 

                          Race                      Unknown

 

                          Additional Race(s)        Unavailable



                                                    Unavailable

 

                          Ethnic Group              Unknown









Author







                          Organization              [facility]

t

 

                          Address                   [address]



                                                    [place] [address]

 

                          Phone                     [phone]









Support







                Name            Relationship    Address         Phone

 

                NALDO YADAV Significant     [address]        [phone]



                                                [place] [address] 

 

                NALDO YADAV Unavailable     [address]        [phone]



                                                [place] [address] 

 

                JHONNYKVNG               [address]     [phone]



                                                [place] [address] 

 

                RUI BARRY CLARK MCKEON               [address]     [place]

[place] [address] 

 

                Rui Zayas          [address]     [phone]





                                                [place] [address] 









Care Team Providers







                    Name                Role                Phone

 

                    PCP, PATIENT DOES NOT HAVE A Primary Care Physician [place]

Mouna Olivarez     Attending Clinician Unavailable

 

                    Mireille Jackson MD     Attending Clinician [phone]

 

                    Unknown, Attending  Attending Clinician Unavailable

 

                    MIREILLE JACKSON        Attending Clinician Unavailable

 

                    Lucero Ryan  Attending Clinician [phone]

 

                    Provider, [facility] Attending Clinician Unavailable

 

                    Doctor Unassigned, No Name Attending Clinician Unavailable

 

                    Jaquan Land Attending Clinician [phone]

 

                    JAYNE Amaro Attending Clinician [phone]

 

                    JAYNE ROSS     Attending Clinician Unavailable

 

                    Nancy Gomez MD Attending Clinician [phone]

 

                    MATTHEW GOSS     Attending Clinician Unavailable

 

                    Emma Holt Attending Clinician [phone]

 

                    Pcp, Patient Does Not Have A Attending Clinician [phone]

[phone]

 

                    Mouna Muse     Admitting Clinician Unavailable

 

                    JAYNE ROSS     Admitting Clinician Unavailable

 

                    MATTHEW GOSS     Admitting Clinician Unavailable









Payers







           Payer Name Policy Type Policy Number Effective Date Expiration Date LIAM augustine

 

           [facility]            [id number] 2016 00:00:00            







Problems







       Condition Condition Condition Status Onset  Resolution Last   Treating Co

mments 

Source



       Name   Details Category        Date   Date   Treatment Clinician        



                                                 Date                 

 

       Flexural Flexural Disease Active                              Unive

rs



       eczema eczema               9-30                               ity of



                                   00:00:                             [place]



                                   00                                 Medical



                                                                      Branch

 

       Anxiety Anxiety Disease Active                              [facility]



                                   9-30                               ity of



                                   00:00:                             [place]



                                   00                                 Medical



                                                                      Branch

 

       Positive Positive Disease Active                              Unive

rs



       JULIAN    JULIAN                  2-20                               ity of



       (antinucle (antinucle               00:00:                             [place]

[place]



       ar     ar                   00                                 Medical



       antibody) antibody)                                                  [facility]

ch

 

       H. pylori H. pylori Disease Active                       Overview: 

Univers



       infection infection               1-18                        Added  ity 

of



                                   00:00:                      automatic [place]



                                   00                          ally from Medical



                                                               request Branch



                                                               for    



                                                               surgery 



                                                               [id number] 

 

       Gastritis Gastritis Disease Active                       Overview: 

[facility]



       without without               1-18                        Added  ity of



       bleeding, bleeding,               00:00:                      automatic [facility]

[place]



       unspecifie unspecifie               00                          ally from

 Medical



       d      d                                                request Branch



       chronicity chronicity                                           for    



       ,      ,                                                surgery 



       unspecifie unspecifie                                           [id number] 



       d      d                                                       



       gastritis gastritis                                                  



       type   type                                                    

 

       Gastritis, Gastritis, Disease Active                              U

nivers



       presence presence               1-08                               ity of



       of     of                   00:00:                             [place]



       bleeding bleeding               00                                 Medica

l



       unspecifie unspecifie                                                  Br

anch



       d,     d,                                                      



       unspecifie unspecifie                                                  



       d      d                                                       



       chronicity chronicity                                                  



       ,      ,                                                       



       unspecifie unspecifie                                                  



       d      d                                                       



       gastritis gastritis                                                  



       type   type                                                    

 

       Post-jasmina Post-jasmina Disease Active                              U

nivers



       cystectomy cystectomy               1-08                               it

y of



       syndrome syndrome               00:00:                             [place]



                                   00                                 Medical



                                                                      Branch

 

       Epigastric Epigastric Disease Active                              U

nivers



       abdominal abdominal               7-29                               ity 

of



       pain   pain                 00:00:                             [place]



                                   00                                 Medical



                                                                      Branch

 

       Migraines Migraines Disease Active                                    Uni

vers



                                                                      ity of



                                                                      [place]



                                                                      Medical



                                                                      Branch







Allergies, Adverse Reactions, Alerts







       Allergy Allergy Status Severity Reaction(s) Onset  Inactive Treating Comm

ents 

Source



       Name   Type                        Date   Date   Clinician        

 

       No Known DA     Active U                                   [facility]



       Allergie                             8                        [facility]



       [facility]                                  [facility]00:                      [facility]



                                          00                          l of



                                                                      [place]

 

       No Known DA     Active U                                   [facility]



       Allergie                             8-31                        [facility]



       s                                  00:00:                      [facility]



                                          00                          l of



                                                                      [place]

 

       NO KNOWN Drug   Active                                           [facility]



       ALLERGIE Class                                                   ity of



       S                                                              [facility]







Social History







           Social Habit Start Date Stop Date  Quantity   Comments   Source

 

           ASSERTION  2021            Pregnant              University 



                      00:00:00                                    [facility]

 

           Exposure to 2022-11-10 2022 Not sure              [facility]-CoV-2 00:00:00   15:27:00                         [facility]



           (event)                                                [place]

 

           Tobacco use and 2022 Smokeless tobacco            Un

iversity of



           exposure   00:00:00   00:00:00   non-user              [facility]

 

           Alcohol intake 2022 Ex-drinker            [facility]



                      00:00:00   00:00:00   (finding)             [facility]

 

           Alcohol Comment 2017 Socially              [facility]

y of



                      00:00:00   00:00:00                         [facility]

 

           Sex Assigned At [birth year]                       [facility]

y of



           Birth      00:00:00   00:00:00                         [facility]









                Smoking Status  Start Date      Stop Date       Source

 

                Never smoked tobacco                                 [facility]







Medications







       Ordered Filled Start  Stop   Current Ordering Indication Dosage Frequency

 Signature

                    Comments            Components          Source



     Medication Medication Date Date Medication? Clinician                (SIG) 

          



     Name Name                                                   

 

     neomycin-po      2022      Yes       [id number] 4[drp]      Place 4        

   Univers



     lymyxin-hyd      1-20                               Drops in           ity 

of



     rocortisone      00:00:                               right ear           T

[facility]



     otic      00                                 4 (four)           Medical



     solution                                         times           [place]



                                                  daily.           

 

     loratadine      2021- No        [id number] 10mg      Take 1           

Univers



     (CLARITIN)      6-14 07-15                          tablet by           ity

 of



     10 mg      00:00: 04:59                          mouth           [place]



     tablet      00   :00                           daily for           Medical



                                                  30 days.           Branch

 

     loratadine      2021- No        [id number] 10mg      Take 1           

Univers



     (CLARITIN)      6-14 07-15                          tablet by           ity

 of



     10 mg      00:00: 04:59                          mouth           [place]



     tablet      00   :00                           daily for           Medical



                                                  30 days.           Branch

 

     acetaminoph      2021- No             1000mg      1,000 mg,         

  Univers



     en        09 05-09                          Oral,           ity of



     (TYLENOL)      04:15: 03:21                          ONCE, 1           [place]

s



     tablet      00   :00                           dose, Sat           Medical



     1,000 mg                                         21 at           Branch



                                                  2315,           



                                                  Routine           

 

     cyclobenzap      2020-0      Yes       [id number] 10mg      Take 1          

 [facility]



     rine 10 mg      6-08                               tablet by           ity 

of



     tablet      00:00:                               mouth 3           [place]



               00                                 (three)           Medical



                                                  times           Branch



                                                  daily.           

 

     ibuprofen      2020-0      Yes       [id number] 600mg      Take 1           

Univers



     600 mg      6-08                               tablet by           ity of



     tablet      00:00:                               mouth           [place]



               00                                 every 6           Medical



                                                  (six)           Branch



                                                  hours as           



                                                  needed for           



                                                  Pain           



                                                  (scale           



                                                  4-6).           

 

     cyclobenzap      2020-0      Yes       [id number] 10mg      Take 1          

 [facility]



     rine 10 mg      6-08                               tablet by           ity 

of



     tablet      00:00:                               mouth 3           [place]



               00                                 (three)           Medical



                                                  times           Branch



                                                  daily.           

 

     ibuprofen      2020-0      Yes       [id number] 600mg      Take 1           

Univers



     600 mg      6-08                               tablet by           ity of



     tablet      00:00:                               mouth           [place]



               00                                 every 6           Medical



                                                  (six)           Branch



                                                  hours as           



                                                  needed for           



                                                  Pain           



                                                  (scale           



                                                  4-6).           

 

     cyclobenzap      2020-0      Yes       [id number] 10mg      Take 1          

 Univers



     rine 10 mg      6-08                               tablet by           ity 

of



     tablet      00:00:                               mouth 3           [place]



               00                                 (three)           Medical



                                                  times           Branch



                                                  daily.           

 

     ibuprofen      2020-0      Yes       [id number] 600mg      Take 1           

Univers



     600 mg      6-08                               tablet by           ity of



     tablet      00:00:                               mouth           [place]



               00                                 every 6           Medical



                                                  (six)           Branch



                                                  hours as           



                                                  needed for           



                                                  Pain           



                                                  (scale           



                                                  4-6).           

 

     cyclobenzap      2020-0      Yes       [id number] 10mg      Take 1          

 Univers



     rine 10 mg      6-08                               tablet by           ity 

of



     tablet      00:00:                               mouth 3           [place]



               00                                 (three)           Medical



                                                  times           Branch



                                                  daily.           

 

     ibuprofen      2020-0      Yes       [id number] 600mg      Take 1           

Univers



     600 mg      6-08                               tablet by           ity of



     tablet      00:00:                               mouth           [place]



               00                                 every 6           Medical



                                                  (six)           Branch



                                                  hours as           



                                                  needed for           



                                                  Pain           



                                                  (scale           



                                                  4-6).           

 

     cyclobenzap      2020-0      Yes       [id number] 10mg      Take 1          

 Univers



     rine 10 mg      6-08                               tablet by           ity 

of



     tablet      00:00:                               mouth 3           [place]



               00                                 (three)           Medical



                                                  times           Branch



                                                  daily.           

 

     ibuprofen      2020-0      Yes       [id number] 600mg      Take 1           

Univers



     600 mg      6-08                               tablet by           ity of



     tablet      00:00:                               mouth           [place]



               00                                 every 6           Medical



                                                  (six)           Branch



                                                  hours as           



                                                  needed for           



                                                  Pain           



                                                  (scale           



                                                  4-6).           

 

     cyclobenzap      2020-0      Yes       [id number] 10mg      Take 1          

 Univers



     rine 10 mg      6-08                               tablet by           ity 

of



     tablet      00:00:                               mouth 3           [place]



               00                                 (three)           Medical



                                                  times           Branch



                                                  daily.           

 

     ibuprofen      2020-0      Yes       [id number] 600mg      Take 1           

Univers



     600 mg      6-08                               tablet by           ity of



     tablet      00:00:                               mouth           [place]



               00                                 every 6           Medical



                                                  (six)           Branch



                                                  hours as           



                                                  needed for           



                                                  Pain           



                                                  (scale           



                                                  4-6).           

 

     cyclobenzap      2020-0      Yes       [id number] 10mg      Take 1          

 Univers



     rine 10 mg      6-08                               tablet by           ity 

of



     tablet      00:00:                               mouth 3           [place]



               00                                 (three)           Medical



                                                  times           Branch



                                                  daily.           

 

     ibuprofen      2020-0      Yes       [id number] 600mg      Take 1           

Univers



     600 mg      6-08                               tablet by           ity of



     tablet      00:00:                               mouth           [place]



               00                                 every 6           Medical



                                                  (six)           Branch



                                                  hours as           



                                                  needed for           



                                                  Pain           



                                                  (scale           



                                                  4-6).           

 

     ibuprofen      2020-0 2020- No        [id number] 600mg      Take 1          

 [facility]



     600 mg      6-08 06-08                          tablet by           ity of



     tablet      00:00: 00:00                          mouth           [place]



               00   :00                           every 6           Medical



                                                  (six)           Branch



                                                  hours as           



                                                  needed for           



                                                  Pain           



                                                  (scale           



                                                  4-6).           

 

     ibuprofen      2020-0 2020- No        [id number] 600mg      Take 1          

 [facility]



     600 mg      6-08 06-08                          tablet by           ity of



     tablet      00:00: 00:00                          mouth           [place]



               00   :00                           every 6           Medical



                                                  (six)           Branch



                                                  hours as           



                                                  needed for           



                                                  Pain           



                                                  (scale           



                                                  4-6).           

 

     traMADol      2019      Yes       [id number] 50mg      Take 1           [facility]

[facility]



     (ULTRAM) 50      0-22                               tablet by           [facility]

 of



     mg tablet      00:00:                               mouth           [place]



               00                                 every 6           Medical



                                                  (six)           Branch



                                                  hours as           



                                                  needed for           



                                                  Pain           



                                                  (scale           



                                                  7-10).           

 

     traMADol      2019      Yes       [id number] 50mg      Take 1           [facility]

[facility]



     (ULTRAM) 50      0-22                               tablet by           [facility]

 of



     mg tablet      00:00:                               mouth           [place]



               00                                 every 6           Medical



                                                  (six)           Branch



                                                  hours as           



                                                  needed for           



                                                  Pain           



                                                  (scale           



                                                  7-10).           

 

     traMADol      2019      Yes       [id number] 50mg      Take 1           [facility]

[facility]



     (ULTRAM) 50      0-22                               tablet by           [facility]

 of



     mg tablet      00:00:                               mouth           [place]



               00                                 every 6           Medical



                                                  (six)           Branch



                                                  hours as           



                                                  needed for           



                                                  Pain           



                                                  (scale           



                                                  7-10).           

 

     traMADol      2019      Yes       [id number] 50mg      Take 1           [facility]

[facility]



     (ULTRAM) 50      0-22                               tablet by           [facility]

 of



     mg tablet      00:00:                               mouth           [place]



               00                                 every 6           Medical



                                                  (six)           Branch



                                                  hours as           



                                                  needed for           



                                                  Pain           



                                                  (scale           



                                                  7-10).           

 

     traMADol      2019      Yes       [id number] 50mg      Take 1           [facility]

[facility]



     (ULTRAM) 50      0-22                               tablet by           [facility]

 of



     mg tablet      00:00:                               mouth           [place]



               00                                 every 6           Medical



                                                  (six)           Branch



                                                  hours as           



                                                  needed for           



                                                  Pain           



                                                  (scale           



                                                  7-10).           

 

     traMADol      2019      Yes       [id number] 50mg      Take 1           [facility]

[facility]



     (ULTRAM) 50      0-22                               tablet by           [facility]

 of



     mg tablet      00:00:                               mouth           [place]



               00                                 every 6           Medical



                                                  (six)           Branch



                                                  hours as           



                                                  needed for           



                                                  Pain           



                                                  (scale           



                                                  7-10).           

 

     traMADol      2019      Yes       [id number] 50mg      Take 1           [facility]

[facility]



     (ULTRAM) 50      0-22                               tablet by           [facility]

 of



     mg tablet      00:00:                               mouth           [place]



               00                                 every 6           Medical



                                                  (six)           Branch



                                                  hours as           



                                                  needed for           



                                                  Pain           



                                                  (scale           



                                                  7-10).           

 

     traMADol      2019      Yes       [id number] 50mg      Take 1           [facility]

[facility]



     (ULTRAM) 50      0-22                               tablet by           [facility]

 of



     mg tablet      00:00:                               mouth           [place]



               00                                 every 6           Medical



                                                  (six)           Branch



                                                  hours as           



                                                  needed for           



                                                  Pain           



                                                  (scale           



                                                  7-10).           

 

     ondansetron      2019      Yes       [id number] 4mg       Take 1           

[facility]



     (ZOFRAN      0-08                               tablet by           [facility] of



     ODT) 4 mg      00:00:                               mouth           [place]



     disintegrat      00                                 every 8           Medic

al



     ing tablet                                         (eight)           Branch



                                                  hours as           



                                                  needed for           



                                                  Nausea and           



                                                  Vomiting           



                                                  (N/V).           

 

     ibuprofen      2019      Yes       [id number] 600mg      Take 1           [facility]

[facility]



     600 mg      0-08                               tablet by           [facility] of



     tablet      00:00:                               mouth           [place]



               00                                 every 8           Medical



                                                  (eight)           Branch



                                                  hours as           



                                                  needed for           



                                                  Pain           



                                                  (scale           



                                                  4-6).           

 

     ondansetron      2019      Yes       [id number] 4mg       Take 1           

[facility]



     (ZOFRAN      0-08                               tablet by           [facility] of



     ODT) 4 mg      00:00:                               mouth           [place]



     disintegrat      00                                 every 8           Medic

al



     ing tablet                                         (eight)           Branch



                                                  hours as           



                                                  needed for           



                                                  Nausea and           



                                                  Vomiting           



                                                  (N/V).           

 

     ibuprofen      2019      Yes       [id number] 600mg      Take 1           [facility]

[facility]



     600 mg      0-08                               tablet by           [facility] of



     tablet      00:00:                               mouth           [place]



               00                                 every 8           Medical



                                                  (eight)           Branch



                                                  hours as           



                                                  needed for           



                                                  Pain           



                                                  (scale           



                                                  4-6).           

 

     ondansetron      2019      Yes       [id number] 4mg       Take 1           

[facility]



     (ZOFRAN      0-08                               tablet by           [facility] of



     ODT) 4 mg      00:00:                               mouth           [place]



     disintegrat      00                                 every 8           Medic

al



     ing tablet                                         (eight)           Branch



                                                  hours as           



                                                  needed for           



                                                  Nausea and           



                                                  Vomiting           



                                                  (N/V).           

 

     ibuprofen      2019      Yes       [id number] 600mg      Take 1           [facility]

[facility]



     600 mg      0-08                               tablet by           [facility] of



     tablet      00:00:                               mouth           [place]



               00                                 every 8           Medical



                                                  (eight)           [facility]anch



                                                  hours as           



                                                  needed for           



                                                  Pain           



                                                  (scale           



                                                  4-6).           

 

     ondansetron      2019      Yes       [id number] 4mg       Take 1           

[facility]



     (ZOFRAN      0-08                               tablet by           [facility] of



     ODT) 4 mg      00:00:                               mouth           [place]



     disintegrat      00                                 every 8           Medic

al



     ing tablet                                         (eight)           [facility]



                                                  hours as           



                                                  needed for           



                                                  Nausea and           



                                                  Vomiting           



                                                  (N/V).           

 

     ibuprofen      2019      Yes       [id number] 600mg      Take 1           [facility]

[facility]



     600 mg      0-08                               tablet by           [facility] of



     tablet      00:00:                               mouth           [place]



               00                                 every 8           Medical



                                                  (eight)           Branch



                                                  hours as           



                                                  needed for           



                                                  Pain           



                                                  (scale           



                                                  4-6).           

 

     ondansetron      2019      Yes       [id number] 4mg       Take 1           

[facility]



     (ZOFRAN      0-08                               tablet by           [facility] of



     ODT) 4 mg      00:00:                               mouth           [place]



     disintegrat      00                                 every 8           Medic

al



     ing tablet                                         (eight)           [facility]



                                                  hours as           



                                                  needed for           



                                                  Nausea and           



                                                  Vomiting           



                                                  (N/V).           

 

     ibuprofen      2019      Yes       [id number] 600mg      Take 1           [facility]

[facility]



     600 mg      0-08                               tablet by           [facility] of



     tablet      00:00:                               mouth           [place]



               00                                 every 8           Medical



                                                  (eight)           [facility]anch



                                                  hours as           



                                                  needed for           



                                                  Pain           



                                                  (scale           



                                                  4-6).           

 

     ondansetron      2019      Yes       [id number] 4mg       Take 1           

[facility]



     (ZOFRAN      0-08                               tablet by           [facility] of



     ODT) 4 mg      00:00:                               mouth           [place]



     disintegrat      00                                 every 8           Medic

al



     ing tablet                                         (eight)           [facility]



                                                  hours as           



                                                  needed for           



                                                  Nausea and           



                                                  Vomiting           



                                                  (N/V).           

 

     ibuprofen      2019      Yes       [id number] 600mg      Take 1           [facility]

[facility]



     600 mg      0-08                               tablet by           [facility] of



     tablet      00:00:                               mouth           [place]



               00                                 every 8           Medical



                                                  (eight)           Branch



                                                  hours as           



                                                  needed for           



                                                  Pain           



                                                  (scale           



                                                  4-6).           

 

     ondansetron      2019      Yes       [id number] 4mg       Take 1           

[facility]



     (ZOFRAN      0-08                               tablet by           [facility] of



     ODT) 4 mg      00:00:                               mouth           [place]



     disintegrat      00                                 every 8           Medic

al



     ing tablet                                         (eight)           [facility]



                                                  hours as           



                                                  needed for           



                                                  Nausea and           



                                                  Vomiting           



                                                  (N/V).           

 

     ibuprofen      2019      Yes       [id number] 600mg      Take 1           [facility]

[facility]



     600 mg      0-08                               tablet by           [facility] of



     tablet      00:00:                               mouth           [place]



               00                                 every 8           Medical



                                                  (eight)           Branch



                                                  hours as           



                                                  needed for           



                                                  Pain           



                                                  (scale           



                                                  4-6).           

 

     ondansetron      2019      Yes       [id number] 4mg       Take 1           

[facility]



     (ZOFRAN      0-08                               tablet by           [facility] of



     ODT) 4 mg      00:00:                               mouth           [place]



     disintegrat      00                                 every 8           Medic

al



     ing tablet                                         (eight)           [facility]



                                                  hours as           



                                                  needed for           



                                                  Nausea and           



                                                  Vomiting           



                                                  (N/V).           

 

     ibuprofen      2019      Yes       [id number] 600mg      Take 1           [facility]

[facility]



     600 mg      0-08                               tablet by           [facility] of



     tablet      00:00:                               mouth           [place]



               00                                 every 8           Medical



                                                  (eight)           Branch



                                                  hours as           



                                                  needed for           



                                                  Pain           



                                                  (scale           



                                                  4-6).           

 

     SERTraline            Yes       [id number] 25mg      Take 1           [facility]

[facility]



     25 mg      9-30                               tablet by           [facility] of



     tablet      00:00:                               mouth           [place]



               00                                 every           Medical



                                                  morning.           Branch

 

     mometasone      0      Yes       [id number]           Apply to          

 [facility]



     0.1 % cream      9-30                               area(s)           ity o

f



               00:00:                               daily.           [place]



                                                               [facility]

 

     SERTraline      0      Yes       [id number] 25mg      Take 1           [facility]

n[facility]



     25 mg      9-30                               tablet by           ity of



     tablet      00:00:                               mouth           [place]



               00                                 every           Medical



                                                  morning.           Branch

 

     mometasone      2019-0      Yes       [id number]           Apply to          

 [facility]



     0.1 % cream      9-30                               area(s)           ity o

f



               00:00:                               daily.           [place]



                                                               Medical



                                                                 Branch

 

     SERTraline      2019-0      Yes       [id number] 25mg      Take 1           [facility]

nivers



     25 mg      9-30                               tablet by           ity of



     tablet      00:00:                               mouth           [place]



               00                                 every           Medical



                                                  morning.           Branch

 

     mometasone      2019-0      Yes       [id number]           Apply to          

 [facility]



     0.1 % cream      9-30                               area(s)           ity o

f



               00:00:                               daily.           [place]



                                                               [facility]

 

     SERTraline      -0      Yes       [id number] 25mg      Take 1           [facility]

nivers



     25 mg      9-30                               tablet by           ity of



     tablet      00:00:                               mouth           [place]



               00                                 every           Medical



                                                  morning.           Branch

 

     mometasone      2019-0      Yes       [id number]           Apply to          

 [facility]



     0.1 % cream      9-30                               area(s)           ity o

f



               00:00:                               daily.           [address]

 

     SERTraline            Yes       [id number] 25mg      Take 1           U

nivers



     25 mg      9-30                               tablet by           ity of



     tablet      00:00:                               mouth           [place]



               00                                 every           Medical



                                                  morning.           [place]

 

     mometasone            Yes       [id number]           Apply to          

 [facility]



     0.1 % cream      9-30                               area(s)           ity o

f



               00:00:                               daily.           [address]

 

     SERTraline            Yes       [id number] 25mg      Take 1           [facility]

nivers



     25 mg      9-30                               tablet by           ity of



     tablet      00:00:                               mouth           [place]



               00                                 every           Medical



                                                  morning.           [place]

 

     mometasone            Yes       [id number]           Apply to          

 [facility]



     0.1 % cream      9-30                               area(s)           ity o

f



               00:00:                               daily.           [address]

 

     SERTraline            Yes       [id number] 25mg      Take 1           U

nivers



     25 mg      9-30                               tablet by           ity of



     tablet      00:00:                               mouth           [place]



               00                                 every           Medical



                                                  morning.           [place]

 

     mometasone            Yes       [id number]           Apply to          

 [facility]



     0.1 % cream      9-30                               area(s)           ity o

f



               00:00:                               daily.           [address]

 

     SERTraline            Yes       [id number] 25mg      Take 1           U

nivers



     25 mg      9-30                               tablet by           ity of



     tablet      00:00:                               mouth           [place]



               00                                 every           Medical



                                                  morning.           [place]

 

     mometasone            Yes       [id number]           Apply to          

 [facility]



     0.1 % cream      9-30                               area(s)           ity o

f



               00:00:                               daily.           [address]

 

     cefTRIAXone       2019- No        [id number] 1000mg                    

 [facility]



     (ROCEPHIN)                                               ity of



     injection      15:15: 14:30                                         [place]



     1,000 mg      00   :00                                          [facility]

 

     cefTRIAXone       2019- No        [id number] 1000mg      1,000 mg,     

      [facility]



     (ROCEPHIN)                                Intramuscu           it

y of



     injection      15:15: 14:30                          lar, ONCE,           [place]

[place]



     1,000 mg      00   :00                           1 dose,           Medical



                                                  Mon 19           Branch



                                                  at 1015,           



                                                  ASAP<br>Re           



                                                  ason for           



                                                  Anti-Infec           



                                                  tive:           



                                                  Documented           



                                                  Infection<           



                                                  br>Documen           



                                                  cristian            



                                                  Infection           



                                                  Site:           



                                                  Respirator           



                                                  [email or web address]           



                                                  ion of           



                                                  Therapy: 7           



                                                  days           

 

     cefTRIAXone       2019- No        [id number] 1000mg                    

 [facility]



     (ROCEPHIN)                                               ity of



     injection      15:15: 14:30                                         [place]



     1,000 mg      00   :00                                          Medical



                                                                 Branch

 

     cefTRIAXone       2019- No        [id number] 1000mg      1,000 mg,     

      [facility]



     (ROCEPHIN)                                Intramuscu           it

y of



     injection      15:15: 14:30                          lar, ONCE,           T

[place]



     1,000 mg      00   :00                           1 dose,           Medical



                                                  Mon 19           Branch



                                                  at 1015,           



                                                  ASAP<br>Re           



                                                  ason for           



                                                  Anti-Infec           



                                                  tive:           



                                                  Documented           



                                                  Infection<           



                                                  br>Documen           



                                                  cristian            



                                                  Infection           



                                                  Site:           



                                                  Respirator           



                                                  [email or web address]           



                                                  ion of           



                                                  Therapy: 7           



                                                  days           

 

     amoxicillin       2019- No        [id number] 500mg      Take 1         

  [facility]



     500 mg                                capsule by           ity of



     capsule      00:00: 04:59                          mouth 2           [place]



               00   :00                           (two)           Medical



                                                  times           [facility]anch



                                                  daily for           



                                                  10 days.           

 

     amoxicillin       2019- No        [id number] 500mg      Take 1         

  [facility]



     500 mg                                capsule by           ity of



     capsule      00:00: 04:59                          mouth 2           [place]



               00   :00                           (two)           Medical



                                                  times           Branch



                                                  daily for           



                                                  10 days.           

 

     benzonatate            Yes       [id number] 100mg      Take 1          

 [facility]



     (TESSALON      5-07                               capsule by           ity 

of



     PERLES) 100      00:00:                               mouth 3           [place]

as



     mg capsule      00                                 (three)           Medica

l



                                                  times           Branch



                                                  daily.           

 

     benzonatate       2019- No        [id number] 100mg      Take 1         

  [facility]



     (TESSALON      5-07 -                          capsule by           [place]

 



     PERLES) 100      00:00: 00:00                          mouth 3           [place]

[place]



     mg capsule      00   :00                           (three)           Medica

l



                                                  times           Branch



                                                  daily.           

 

     No known                No                                      [facility]



     medications                                                        ity of



                                                                 [facility]







Immunizations







           Ordered Immunization Filled Immunization Date       Status     Commen

ts   Source



           Name       Name                                        

 

           Meningococcal            2017 Completed             [facility] 

of



           Polysaccharide            00:00:00                         [place] [facility]

[facility]



           (groups A, C, Y and                                             Branc

h



           W-135) conjugate                                             



           vaccine (MCV4P)                                             

 

           Meningococcal            2017 Completed             [facility] 

of



           Polysaccharide            00:00:00                         [place] [facility]

[facility]



           (groups A, C, Y and                                             Branc

h



           W-135) conjugate                                             



           vaccine (MCV4P)                                             

 

           Meningococcal            2017 Completed             [facility] 

of



           Polysaccharide            00:00:00                         [place] [facility]

[facility]



           (groups A, C, Y and                                             Branc

h



           W-135) conjugate                                             



           vaccine (MCV4P)                                             

 

           Meningococcal            2017 Completed             [facility] 

of



           Polysaccharide            00:00:00                         [place] [facility]

[facility]



           (groups A, C, Y and                                             Branc

h



           W-135) conjugate                                             



           vaccine (MCV4P)                                             

 

           Meningococcal            2017 Completed             [facility] 

of



           Polysaccharide            00:00:00                         [place] [facility]

[facility]



           (groups A, C, Y and                                             Branc

h



           W-135) conjugate                                             



           vaccine (MCV4P)                                             

 

           Meningococcal            2017 Completed             [facility] 

of



           Polysaccharide            00:00:00                         [place] [facility]

[facility]



           (groups A, C, Y and                                             Branc

h



           W-135) conjugate                                             



           vaccine (MCV4P)                                             

 

           Meningococcal            2017 Completed             [facility] 

of



           Polysaccharide            00:00:00                         [place] [facility]

[facility]



           (groups A, C, Y and                                             Branc

h



           W-135) conjugate                                             



           vaccine (MCV4P)                                             

 

           Meningococcal            2017 Completed             [facility] 

of



           Polysaccharide            00:00:00                         [place] [facility]

[facility]



           (groups A, C, Y and                                             Branc

h



           W-135) conjugate                                             



           vaccine (MCV4P)                                             

 

           Meningococcal            2017 Completed             [facility] 

of



           Polysaccharide            00:00:00                         [place] [facility]

[facility]



           (groups A, C, Y and                                             Branc

h



           W-135) conjugate                                             



           vaccine (MCV4P)                                             

 

           Meningococcal            2017 Completed             [facility] 

of



           Polysaccharide            00:00:00                         [place] [facility]

[facility]



           (groups A, C, Y and                                             Branc

h



           W-135) conjugate                                             



           vaccine (MCV4P)                                             

 

           Meningococcal            2017 Completed             [facility] 

of



           Polysaccharide            00:00:00                         [place] [facility]

[facility]



           (groups A, C, Y and                                             Branc

h



           W-135) conjugate                                             



           vaccine (MCV4P)                                             

 

           Meningococcal            2017 Completed             [facility] 

of



           Polysaccharide            00:00:00                         [place] [facility]

[facility]



           (groups A, C, Y and                                             Branc

h



           W-135) conjugate                                             



           vaccine (MCV4P)                                             

 

           Meningococcal            2017 Completed             [facility] 

of



           Polysaccharide            00:00:00                         [place] [facility]

[facility]



           (groups A, C, Y and                                             Branc

h



           W-135) conjugate                                             



           vaccine (MCV4P)                                             

 

           Meningococcal            2017 Completed             [facility] 

of



           Polysaccharide            00:00:00                         [place] [facility]

[facility]



           (groups A, C, Y and                                             Branc

h



           W-135) conjugate                                             



           vaccine (MCV4P)                                             

 

           HPV                   2012 Completed             [facility]



                                 00:00:00                         [facility]

 

           Influenza Virus            2012 Completed             Universit

y of



           Vaccine - Whole            00:00:00                         [facility]

 

           HPV                   2012 Completed             [facility]



                                 00:00:00                         [facility]

 

           Influenza Virus            2012 Completed             Universit

y of



           Vaccine - Whole            00:00:00                         [facility]

 

           HPV                   2012 Completed             [facility]



                                 00:00:00                         [facility]

 

           Influenza Virus            2012 Completed             Universit

y of



           Vaccine - Whole            00:00:00                         [facility]

 

           HPV                   2012 Completed             [facility]



                                 00:00:00                         [facility]

 

           Influenza Virus            2012 Completed             Universit

y of



           Vaccine - Whole            00:00:00                         [facility]

 

           HPV                   2012 Completed             [facility]



                                 00:00:00                         [facility]

 

           Influenza Virus            2012 Completed             Universit

y of



           Vaccine - Whole            00:00:00                         [facility]

 

           HPV                   2012 Completed             [facility]



                                 00:00:00                         [facility]

 

           Influenza Virus            2012 Completed             Universit

y of



           Vaccine - Whole            00:00:00                         [facility]

 

           HPV                   2012 Completed             [facility]



                                 00:00:00                         [facility]

 

           Influenza Virus            2012 Completed             Universit

y of



           Vaccine - Whole            00:00:00                         [facility]

 

           HPV                   2012 Completed             [facility]



                                 00:00:00                         [facility]

 

           Influenza Virus            2012 Completed             Universit

y of



           Vaccine - Whole            00:00:00                         [facility]

 

           HPV                   2012 Completed             [facility]



                                 00:00:00                         [facility]

 

           Influenza Virus            2012 Completed             Universit

y of



           Vaccine - Whole            00:00:00                         [facility]

 

           HPV                   2012 Completed             [facility]



                                 00:00:00                         [facility]

 

           Influenza Virus            2012 Completed             Universit

y of



           Vaccine - Whole            00:00:00                         [facility]

 

           HPV                   2012 Completed             [facility]



                                 00:00:00                         [facility]

 

           Influenza Virus            2012 Completed             Universit

y of



           Vaccine - Whole            00:00:00                         [facility]

 

           HPV                   2012 Completed             [facility]



                                 00:00:00                         [facility]

 

           Influenza Virus            2012 Completed             Universit

y of



           Vaccine - Whole            00:00:00                         [facility]

 

           HPV                   2012 Completed             [facility]



                                 00:00:00                         [facility]

 

           Influenza Virus            2012 Completed             Universit

y of



           Vaccine - Whole            00:00:00                         [place] [facility]

ical



                                                                  [facility]

 

           HPV                   2012 Completed             [facility]



                                 00:00:00                         [facility] [place] [facility]



                                                                  Branch

 

           HPV                   2012 Completed             [facility]



                                 00:00:00                         [facility] [place] [facility]



                                                                  Branch

 

           HPV                   2012 Completed             [facility]



                                 00:00:00                         [facility] [place] [facility]



                                                                  Branch

 

           HPV                   2012 Completed             [facility]



                                 00:00:00                         [facility] [place] [facility]



                                                                  Branch

 

           HPV                   2012 Completed             [facility]



                                 00:00:00                         [facility] [place] [facility]



                                                                  Branch

 

           HPV                   2012 Completed             [facility]



                                 00:00:00                         [facility] [place] [facility]



                                                                  Branch

 

           HPV                   2012 Completed             [facility]



                                 00:00:00                         [facility] [place] [facility]



                                                                  Branch

 

           HPV                   2012 Completed             [facility]



                                 00:00:00                         [facility] [place] [facility]



                                                                  Branch

 

           HPV                   2012 Completed             [facility]



                                 00:00:00                         [facility] [place] [facility]



                                                                  Branch

 

           HPV                   2012 Completed             [facility]



                                 00:00:00                         [facility] [place] [facility]



                                                                  Branch

 

           HPV                   2012 Completed             [facility]



                                 00:00:00                         [facility]

 

           HPV                   2012 Completed             [facility]



                                 00:00:00                         [facility]

 

           HPV                   2012 Completed             [facility]



                                 00:00:00                         [facility]







Vital Signs







             Vital Name   Observation Time Observation Value Comments     Source

 

             Systolic blood 2022 21:27:00 131 mm[Hg]                [facility]

[facility] of



             pressure                                            [facility]

 

             Diastolic blood 2022 21:27:00 85 mm[Hg]                 Unive

rsity of



             pressure                                            [facility]

 

             Heart rate   2022 21:27:00 78 /min                   [facility]

 

             Body temperature 2022 21:27:00 37.11 Claudia                 [facility]

 

             Res[facility] rate 2022 21:27:00 18 /min                   [facility]

 

             Body height  2022 21:27:00 165.1 cm                  [facility]

 

             Body weight  2022 21:27:00 65.182 kg                 [facility]

 

             BMI          2022 21:27:00 23.91 kg/m2               [facility]

 

             Oxygen saturation in 2022 21:27:00 100 /min                  

[facility]



             Arterial blood by                                        [place] [facility]

[facility]



             Pulse oximetry                                        [facility]

 

             Systolic blood 2021 16:34:00 115 mm[Hg]                [facility]

sity of



             pressure                                            [place] [facility]



                                                                 [facility]

 

             Diastolic blood 2021 16:34:00 68 mm[Hg]                 [facility]

rsity of



             pressure                                            [place] [facility]



                                                                 [facility]

 

             Heart rate   2021 16:34:00 93 /min                   [facility]

ty of



                                                                 [place] [facility]



                                                                 [facility]

 

             Body temperature 2021 16:34:00 36.94 Claudia                 Univ

ersity of



                                                                 [place] [facility]



                                                                 [facility]

 

             Respiratory rate 2021 16:34:00 12 /min                   Univ

ersity of



                                                                 [place] [facility]



                                                                 [facility]

 

             Body height  2021 16:34:00 165.1 cm                  [facility]

ty of



                                                                 [place] [facility]



                                                                 [facility]

 

             Body weight  2021 16:34:00 68.04 kg                  [facility]

ty of



                                                                 [place] [facility]



                                                                 [facility]

 

             BMI          2021 16:34:00 24.96 kg/m2               [facility]

ty of



                                                                 [place] [facility]



                                                                 [facility]

 

             Oxygen saturation in 2021 16:34:00 97 /min                   

[facility]



             Arterial blood by                                        [place] [facility]

[facility]



             Pulse oximetry                                        [facility]

 

             Systolic blood 2021 02:17:00 118 mm[Hg]                [facility]

sity of



             pressure                                            [place] [facility]



                                                                 [facility]

 

             Diastolic blood 2021 02:17:00 76 mm[Hg]                 Unive

rsity of



             pressure                                            [place] [facility]



                                                                 [facility]

 

             Heart rate   2021 02:17:00 87 /min                   Universi

ty of



                                                                 [facility]



                                                                 Branch

 

             Body temperature 2021 02:17:00 37.11 Claudia                 Univ

ersity of



                                                                 [facility]



                                                                 Branch

 

             Respiratory rate 2021 02:17:00 20 /min                   Univ

ersity of



                                                                 [facility]



                                                                 Branch

 

             Body height  2021 02:17:00 165.1 cm                  Universi

ty of



                                                                 [facility]



                                                                 Branch

 

             Body weight  2021 02:17:00 60.782 kg                 Universi

ty of



                                                                 [facility]



                                                                 Branch

 

             BMI          2021 02:17:00 22.30 kg/m2               Universi

ty of



                                                                 [facility]



                                                                 Branch

 

             Oxygen saturation in 2021 02:17:00 94 /min                   

[facility]



             Arterial blood by                                        [facility]

[facility]



             Pulse oximetry                                        Branch

 

             Systolic blood 2020 19:51:00 122 mm[Hg]                Univer

sity of



             pressure                                            [facility]



                                                                 Branch

 

             Diastolic blood 2020 19:51:00 82 mm[Hg]                 Unive

rsity of



             pressure                                            [facility]



                                                                 Branch

 

             Heart rate   2020 19:51:00 88 /min                   Universi

ty of



                                                                 [facility]



                                                                 Branch

 

             Body temperature 2020 19:51:00 37.06 Claudia                 Univ

ersity of



                                                                 [facility]



                                                                 Branch

 

             Respiratory rate 2020 19:51:00 20 /min                   Univ

ersity of



                                                                 [facility]



                                                                 Branch

 

             Body weight  2020 19:51:00 57.607 kg                 Universi

ty of



                                                                 [facility]

 

             Oxygen saturation in 2020 19:51:00 100 /min                  

[facility]



             Arterial blood by                                        [facility]

[facility]



             Pulse oximetry                                        Branch

 

             Systolic blood 2019 13:56:00 102 mm[Hg]                Univer

sity of



             pressure                                            [facility]



                                                                 Branch

 

             Diastolic blood 2019 13:56:00 69 mm[Hg]                 Unive

rsity of



             pressure                                            [facility]



                                                                 Branch

 

             Heart rate   2019 13:56:00 81 /min                   Universi

ty of



                                                                 [facility]

 

             Body temperature 2019 13:56:00 37 Claudia                    Univ

ersity of



                                                                 [facility]



                                                                 [place]

 

             Body weight  2019 13:56:00 55.792 kg                 Universi

ty of



                                                                 [facility]

 

             Oxygen saturation in 2019 13:56:00 98 /min                   

[facility]



             Arterial blood by                                        [place] [facility]

[facility]



             Pulse oximetry                                        Branch

 

             Systolic blood 2019 14:06:00 103 mm[Hg]                Univer

sity of



             pressure                                            [facility]



                                                                 Branch

 

             Diastolic blood 2019 14:06:00 68 mm[Hg]                 Unive

rsity of



             pressure                                            [facility]

 

             Heart rate   2019 14:06:00 94 /min                   Universi

ty of



                                                                 [facility]



                                                                 [place]

 

             Body temperature 2019 14:06:00 39 Claudia                    Univ

ersity of



                                                                 [facility]

 

             Body weight  2019 14:06:00 59.421 kg                 Universi

ty of



                                                                 [facility]

 

             Oxygen saturation in 2019 14:06:00 99 /min                   

[facility]



             Arterial blood by                                        [facility]

[facility]



             Pulse oximetry                                        Branch







Procedures







                Procedure       Date / Time     Performing Clinician Source



                                Performed                       

 

                [id number]         2021-10-02 00:00:00 KARMA.04        [facility]

 

                [id number]         2021-10-02 00:00:00 KARMA.04        [facility]

 

                ASSIGNMENT OF BENEFITS 2021 16:25:40 Doctor Unassigned, Un

[facility]



                                                [place]         Medical Branch

 

                CONSENT/REFUSAL FOR 2021 02:06:25 Doctor Unassigned, Unive

[facility] [place]



                DIAGNOSIS AND TREATMENT                 [place]         Medical 

Branch

 

                XR LUMBAR SPINE 3 VW 2020 21:17:16 JAYNE Ross [facility]



                                                                Medical [place]

 

                XR SPINE THORACIC 2 VW 2020 21:17:16 JAYNE Ross [facility]



                                                                Medical [place]

 

                XR CERVICAL SPINE 3 VW 2020 21:17:16 JAYNE Ross [facility]

 

                POCT PREGNANCY TEST 2020 20:35:00 JAYNE Ross [facility]

 

                NOTICE OF PRIVACY 2020 20:10:25 Doctor Racheal [facility]



                PRACTICES                       [place]         Medical Branch

 

                CONSENT/REFUSAL FOR 2020 20:10:01 Doctor Unassigned, Unive

[facility]



                DIAGNOSIS AND TREATMENT                 [place]         Medical 

[place]

 

                NOTICE OF PRIVACY 2020 19:26:25 Doctor Unassigned, [facility]



                PRACTICES                       [place]         Medical Branch

 

                CONSENT/REFUSAL FOR 2020 19:26:06 Doctor Jennifer Springer

[facility]



                DIAGNOSIS AND TREATMENT                 [place]         Medical 

[place]

 

                ASSIGNMENT OF BENEFITS 2019 13:40:42 Doctor Racheal [facility]



                                                [place]         Medical Branch

 

                POCT RAPID STREP SCREEN 2019 00:00:00 Lucero Jonas  [facility]



                FOR GROUP A                                     Medical Branch

 

                NO SHOW OR MISSED 2019 13:58:16 Doctor Racheal [facility]



                APPOINTMENT POLICY                 No Name         [facility]



                ACKNOWLEDGEMENT                                 

 

                POCT RAPID STREP SCREEN 2019 00:00:00 Emma Pierce [facility]

[facility] [place]



                FOR GROUP A                                     Medical Branch







Encounters







        Start   End     Encounter Admission Attending Care    Care    Encounter 

Source



        Date/Time Date/Time Type    Type    Clinicians Facility Department ID   

   

 

        2021-10-31         Emergency                 [facility]    [id number] 

[facility]



        17:57:14                                                         [facility]

 

        2021-10-28         Emergency                 [facility]    [id number] 

Univers



        23:52:22                                                         [facility]

 

        2021-10-09         Inpatient         PEDRO Muse   [facility]   [id number] 

[facility]



        06:00:00                         [place] [id number]      Woman's



                                                                        [facility]

 

        2022 Urgent          Mireille Jackson [facility]    [id number]

[id number] Univers



        15:20:00 15:40:00 Care            [facility]  [id number]

         ity of



                                                [place] [id number]         [place]

as



                                                RAHUL?BLEA [phone]         [address]                 



                                                OFFICE                  



                                                BUILDING                 

 

        20[facility]    [id number]

[id number] Univers



        15:20:00 15:33:51                 MIREILLE                          ity [facility]

 

        2021-10-01 2021-10-04 Inpatient TRUPTI Muse,   [facility]   SUNDAY    [id number]

[id number] [facility]



        08:10:00 13:03:00                 Mouna                 00      Woman'

s



                                                                        Hospita



                                                                        l of



                                                                        [place]

 

        2021 Emergency MICKY Muse,   [facility]   SUNDAY    [id number]

[id number] [facility]



        18:21:00 20:50:00                 Mouna                 47      Woman'

s



                                                                        Hospita



                                                                        l of



                                                                        [place]

 

        [facility]    [id number]

[id number] Univers



        00:00:00 00:00:00                 [facility]  [id number]         it

y of



                                                [place] [id number]         [place]

as



                                                Professio [phone]         [address]                 



                                                One                     

 

        2021 Urgent          Provider, [facility]    

1.2.840.114 

[id number]                                Univers



        11:27:55 11:47:55 Care            Lucero Jonas  [id number]    

     ity of



                                                [place] [id number]         [place]

as



                                                Professio [phone]         [facility]     044             [place]



                                                Office                  



                                                [facility]                 



                                                One                     

 

        2021 Outpatient R               [facility]    [id number]

[id number] Univers



        11:20:00 11:20:00                                                 ity of



                                                                        [facility]

 

        2021 Orders          Doctor MARQUEZ    1.2.840.114 [id number]

[id number] Univers



        00:00:00 00:00:00 Only            Unassigned, OSMAN   [id number]       

  ity of



                                        [place] [facility] [id number]         [place]

as



                                                        [phone]         [address]

 

        2021 Emergency         [facility]    1.2.840.114 [id number]

[id number] Univers



        21:20:00 22:49:00                 Jaquan DEAN Kyra [id number]        

 ity of



                                                [place] [id number]         [facility]  [phone]         [address]

 

        2021 Orders          Doctor  JIMMY    1.2.840.114 [id number]

62 Univers



        00:00:00 00:00:00 Only            Unassigned, OSMAN   [id number]       

  ity of



                                        [facility] [id number]         [place]

as



                                                        [phone]         [address]

 

        2020 Emergency         JAYNE Ross [facility]    1.2.840.114 [id number]

[id number] Univers



        15:38:52 18:06:00                 Erin Rae [id number]         i

ty of



                                                [place] [id number]         [facility]  [phone]         [address]

 

        2020 Emergency X       JAYNE ROSS [facility]    ERT     [id number]

[id number] Univers



        15:38:52 18:06:00                                                 ity of



                                                                        [facility]

 

        2020 Emergency         [facility]    [id number]

[id number] Univers



        23:11:06 23:37:00                 Nancy LIAM Rae 350.1.13.10         

ity of



                                                [place] [id number]         [facility]  [phone]         [address]

 

        2019-10-08 2019-10-08 Emergency X       ANA PAULA, [facility]    ERT     [id number]

[id number] Univers



        12:52:58 18:15:00                 SHINTA                          ity of



                                                                        [facility]

 

        2019 Office          [facility]    1.2.840.114 [id number]

74 Univers



        08:41:52 09:31:22 Visit           [facility]  350.1.13.10         it

y of



                                                [place] [id number]         [place]

as



                                                Professio [phone]         [address]                 



                                                One                     

 

        2019 Orders          Doctor  JIMMY    1.2.840.114 [id number]

75 Univers



        00:00:00 00:00:00 Only            Unassigned, OSMAN   350.1.13.10       

  ity of



                                        [place] [facility] [id number]         [place]

as



                                                        [phone]         [address]

 

        2019 Office          Kari, [facility]    1.2.840.114 [id number]

17 Univers



        09:00:31 09:31:00 Visit           [facility]  350.1.13.10         i

ty of



                                                [place] [id number]         [place]

as



                                                Professio [phone]         [address]                 



                                                One                     

 

        2019 Orders          Doctor  JIMMY    1.2.840.114 [id number]

06 Univers



        00:00:00 00:00:00 Only            Unassigned, OSMAN   350.1.13.10       

  ity of



                                        [place] [facility] [id number]         [place]

as



                                                        [phone]         [address]

 

        2019 Letter          Pcp,    [facility]    1.2.840.114 [id number]

77 Univers



        00:00:00 00:00:00 (Out)           [facility]  350.1.13.10         it

y of



                                        Does Not [place] [id number]         Te

xas



                                        Have A  Professio [phone]         [address]                 



                                                One                     







Results







           Test Description Test Time  Test Comments Results    Result Comments 

Source









                    HGB HCT             2021-10-03 07:03:00 









                      Test Item  Value      Reference Range Interpretation Comme

nts









             HEMOGLOBIN (test code = HGB) 9.9 g/dL     10.1-13.8    L           

 

 

             HEMATOCRIT (test code = HCT) 30.9 %       32.5-41.8    L           

 



CAPILLARY BLOOD [id number] 05:44:00





             Test Item    Value        Reference Range Interpretation Comments

 

             CAPILLARY BLOOD GAS PH (test code 6.973        7.35-7.45    LL     

      



             = PHC)                                              

 

             CAPILLARY BLOOD GAS PCO2 (test 95.3 mmHg                           

   



             code = PCO2C)                                        

 

             CBG HCO3 (test code = HCO3C) 21.6 meq/L                            

 

 

             CBG BASE EXCESS (test code = BEC) -12.4                            

      

 

             CAPILLARY BLOOD GAS TYPE (test CBLA                                

   



             code = TYPEC)                                        

 

             CAPILLARY BLOOD GAS FIO2 (test 21.0 %                              

   



             code = FIO2C)                                        



CAPILLARY BLOOD [id number] 05:43:00





             Test Item    Value        Reference Range Interpretation Comments

 

             CAPILLARY BLOOD GAS PH (test code 7.260        7.35-7.45    L      

      



             = PHC)                                              

 

             CAPILLARY BLOOD GAS PCO2 (test 53.3 mmHg                           

   



             code = PCO2C)                                        

 

             CAPILLARY BLOOD GAS PO2 (test code 19.4 mmHg                       

       



             = PO2C)                                             

 

             CBG HCO3 (test code = HCO3C) 23.4 meq/L                            

 

 

             CBG BASE EXCESS (test code = BEC) -4.3                             

      

 

             CBG O2 SATURATION (test code = 24.2 %                              

   



             SATC)                                               

 

             CAPILLARY BLOOD GAS TYPE (test CBLV                                

   



             code = TYPEC)                                        

 

             CAPILLARY BLOOD GAS FIO2 (test 21.0 %                              

   



             code = FIO2C)                                        



AG HEPATITIS B SURFACE2021-10-01 11:00:00





             Test Item    Value        Reference Range Interpretation Comments

 

             AG HEPATITIS B SURFACE (test code NONREACTIVE  NONREACTIVE         

      



             = HBSAG)                                            



AB HEPATITIS C PROFILE2021-10-01 11:00:00





             Test Item    Value        Reference Range Interpretation Comments

 

             AB HEPATITIS C (test code = NONREACTIVE  NONREACTIVE               



             HCVAB)                                              

 

             SIGNAL TO CUTOFF (test code = <0.02        <0.80        N          

  



             CUTOFF)                                             



AB TREPONEMA2021-10-01 11:00:00





             Test Item    Value        Reference Range Interpretation Comments

 

             AB TREPONEMA (test code = TREPAB) NONREACTIVE  NONREACTIVE         

      



AB HIV 1 22021-10-01 11:00:00





             Test Item    Value        Reference Range Interpretation Comments

 

             AB HIV 1 2 (test NONREACTIVE  NONREACTIVE               [facility]



             code = [id number])                                        4th Gen HIV 

Ag/Ab Combo



                                                                 Screen



COVID 19 Asymptomatic IH AG2021-10-01 09:59:00





             Test Item    Value        Reference Range Interpretation Comments

 

             COVID 19     NEGATIVE     NEGATIVE                  This test has b

een



             Asymptomatic IH AG                                        authorize

d only for the



             (test code =                                        detection ofpro

teins from



             COVNONPUIAG)                                        SARS-CoV-2, not

 for any



                                                                 other viruses



                                                                 orpathogens. Ne

gative



                                                                 results should 

be treated



                                                                 as presumptive



                                                                 andconfirmed wi

th a



                                                                 molecular assay

, if



                                                                 necessary for



                                                                 patientmanageme

nt.



                                                                 Negative result

s do not



                                                                 rule out COVID-

19



                                                                 andshould not b

e used as



                                                                 the sole basis 

for



                                                                 treatment orpat

ient



                                                                 management deci

sions,



                                                                 including infec

tion



                                                                 controldecision

s.



                                                                 Negative result

s should



                                                                 be considered i

n



                                                                 thecontext of a

 patient's



                                                                 recent exposure

s, history



                                                                 and thepresence

 of



                                                                 clinical signs 

and



                                                                 symptoms consis

tent



                                                                 withCOVID-19. T

his test



                                                                 has not been FD

A cleared



                                                                 or approved; th

e test



                                                                 hasbeen authori

melida by FDA



                                                                 under an Emerge

ncy Use



                                                                 Authorization(E

UA) for



                                                                 use by laborato

maddy



                                                                 certified under

 the CLIA



                                                                 thatmeet the re

quirements



                                                                 to perform mode

rate, high



                                                                 or waivedcomple

xity



                                                                 tests. This cecy

t is



                                                                 authorized for 

use at



                                                                 thePoint of Car

e (POC),



                                                                 i.e., in patien

t care



                                                                 settingsoperati

ng under a



                                                                 CLIA Certificat

e of



                                                                 Waiver, Certifi

salome



                                                                 ofCompliance, o

r



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                 



                                Baby A:                         



                                 Birth status: live born                 



                                 Gender: male (Sergio)                 



                                 APGAR 1 minute: 5                 



                                 APGAR 5 minutes: 9                 



                                Nursing data:                   



                                The data set between the solid lines has been im

ported from nursing                 



                                documentation. Any exceptions have been noted be

low under Provider comments.                 



                                ________________________________________________

_____________________________                 



                                                                



                                Delivery date infant A: 10/02/21 Delivery time i

nfant A: 0520                 



                                Birthweight (gm) infant A: 3740                 



                                Feeding preference:                 



                                Gender infant A: Male                 



                                Apgar 1 minute infant A: 5                 



                                Apgar 5 minutes infant A: 9                 



                                Apgar 10 minutes infant A:                 



                                        ________________________________________

______________________________________ 

                                        



                                                                



                                Provider comments on imported nursing data: []  

               



                                _____________________________________________   

              



                                                                



                                Plan: routine postpartum care, discharge today  

               



                                                                



                                Discharge Instructions                 



                                Instructions: routine instr sheet given, instr a

nd warnings rev'd                 



                                Diet: Regular                   



                                                    Activity: As Tolerated, Do n

ot Submerge Incision, Light Duty, No [place] 

for                                                 



                                6 Wks, No Lifting >10lbs, No Strenuous Activity,

 Nothing in vagina pre f/u,                 



                                Shower Only, Walk                 



                                Additional discharge routines: Attending Follow-

Up                 



                                Wound/dressing care: Leave steri strips         

        



                                Contraception discussed: abstinence for 4-6 week

s, will discuss at PP visit                 



                                Discharge meds:                 



                                Continue taking these medications:              

   



                                PNV WITH CA/IRON/FA/DHA (PRENATE ESSENTIAL) 28 M

G IRON-1 MG-300 MG CAP                 



                                 1 CAPSULE ORAL DAILY.                 



                                                                



                                Start taking the following new medications:     

            



                                IBUPROFEN (MOTRIN) 600 MG TAB                 



                                 600 MILLIGRAM ORAL EVERY 6 HOURS AS NEEDED. as 

needed for Mild Pain                 



                                 Qty = 30                       



                                 Refills = 1                    



                                                                



                                OXYCODONE HCL (OXYCODONE) 5 MG TAB              

   



                                 5 MILLIGRAM ORAL EVERY 4 HOURS AS NEEDED. as ne

eded for Moderate to Severe                 



                                Pain                            



                                  Qty = 20                      



                                 No Refills                     



                                                                



                                DOCUSATE SODIUM (COLACE) 100 MG CAP             

    



                                 100 MILLIGRAM ORAL TWICE DAILY AS NEEDED. as ne

eded for CONSTIPATION                 



                                 Qty = 30                       



                                 Refills = 1                    



                                                                



                                FERROUS SULFATE ER (SLOW RELEASE IRON) 142 MG (4

5 MG IRON) TAB.ER                 



                                 1 TABLET ORAL DAILY.                 



                                 Qty = 30                       



                                 No Refills                     



                                                                



                                                                



                                Prescriptions: e-prescribe                 



                                                                



                                Electronically Signed by Lisa Salomon MD on  at 0956                 



                                                                



                                RPT #:[id number]                 



                                ***END OF REPORT***                 



                                                                



                                                                

 

                2021-10-03 11:15:00-00:00                                 [facility] ([facility])               

  



                                OB Postpart Progr Note                 



                                REPORT#:[id number] REPORT STATUS: Signed         

        



                                DATE:10/03/21 TIME: 1115                 



                                                                



                                PATIENT: CHINA BARRY UNIT #: [id number]    

             



                                ACCOUNT#: [id number] ROOM/BED:        

          



                                : [birth year] AGE: 21 SEX: F ATTEND: Maral Muse MD                 



                                ADM DT: 10/01/21 AUTHOR: Re Johnson MD      

           



                                                                



                                * ALL edits or amendments must be made on the el

[employer]/computer document *                 



                                                                



                                                                



                                Subjective                      



                                                                



                                Subjective                      



                                Admission EGA:                  



                                 Weeks: 40                      



                                 Days: 1                        



                                EGA at delivery (wks/days): 40 weeks (40w2d)    

             



                                Status/Day: post operative (day #1)             

    



                                Patient reports:                 



                                 Comments:                      



                                Some pain when moving out of bed, but overall co

ntrolled. Tolerating regular                 



                                diet, voiding freely, +flatus. Baby doing well a

t bedside.                 



                                                                



                                Objective                       



                                                                



                                Nursing Documentation Review                 



                                Nursing Data:                   



                                The data set between the solid lines has been im

ported from nursing                 



                                documentation. Any exceptions have been noted be

low under Provider comments.                 



                                                    

________________________________________________________________________________
                                                    



                                                                



                                Feeding preference:                 



                                                                



                                Post partum hemorrhage risk score: Low Risk for 

Hemorrhage.                 



                                                    

________________________________________________________________________________
                                                    



                                                                



                                Provider comments on imported nursing data: []  

               



                                _____________________________________________   

              



                                                                



                                                                



                                General                         



                                VS:                             



                                Vital Signs:                    



                                 Date Time Temp  Pulse Resp B/P B/P Pulse O2 O2 

Flow FiO2                 



                                 Mean Ox Delivery Rate                 



                                 10/03 0846 98.2 68 18 104/65                 



                                 10/02 2355 97.4  88 18 111/65                 



                                                                



                                PATIENT WEIGHT:                 



                                                                



                                Weight (lb): 176                 



                                Weight (oz):                    



                                Weight (kg): 79.511231                 



                                                                



                                                                



                                Physical Exam                   



                                Lungs: unlabored breathing                 



                                Neuro:                          



                                  Exam: alert, oriented x3, normal speech       

          



                                Abdomen: soft, no abnormal tenderness, no guardi

ng, no rebound tenderness                 



                                Incision site: well approximated edges, dry, no 

drainage, no inflammation                 



                                Uterus: firm, involution appropriate, non-tender

                 



                                Fundus: firm, below the umbilicus, non-tender   

              



                                Lower extremities:                 



                                 Edema: none                    



                                 Calf tenderness: negative                 



                                                                



                                                                



                                Diagnosis, Assessment Plan                 



                                                                



                                Diagnosis, Assessment Plan                 



                                Free text A P:                  



                                20yo  s/p pCS due to AOD/maternal exhaust

ion after IOL for PROM                 



                                                                



                                1. POD#1                        



                                -AFVSS                          



                                -Pain: Well controlled on po meds               

  



                                -GI: Tolerating regular diet, +flatus           

      



                                -: Voiding freely s/p alegria                 



                                -Heme: 12 --> 9.9, pt denies s'sx of anemia. Ten

n to d/c with po iron                 



                                -Ppx: Encouraged increased ambulation           

      



                                                                



                                2. PNL: RH+, RI/VI, GBS negative                

 



                                3. PMH/PSH: migraine headaches, anxiety/depressi

on (no meds)/cholecystectomy,                 



                                tonsillectomy                   



                                4. All: NKDA                    



                                5. Baby boy Sergio at bedisde, decline circ     

            



                                                                



                                Dispo: Routine post-operative care. Anticipate d

ischarge POD#2/3                 



                                Assessment: nml postpartum progress             

    



                                Plan: routine postpartum care                 



                                Plan discussed with: patient, spouse/partner    

             



                                                                



                                Electronically Signed by Re Johnson MD on 10

/03/21 at 1119                 



                                                                



                                RPT #:[id number]                 



                                ***END OF REPORT***                 



                                                                



                                                                

 

                2021-10-02 06:26:00-00:00                                 [facility]               

  



                                OB Delivery Note                 



                                REPORT#:[id number] REPORT STATUS: Signed         

        



                                DATE:10/02/21 TIME: 626                 



                                                                



                                PATIENT: CHINA BARRY UNIT #: [id number]    

             



                                ACCOUNT#: [id number] ROOM/BED: [facility]        

         



                                : [birth year] AGE: 21 SEX: F  ATTEND: Sugey Muse MD                 



                                ADM DT: 10/01/21 AUTHOR: Re Johnson MD      

           



                                                                



                                * ALL edits or amendments must be made on the el

[employer]/computer document *                 



                                                                



                                                                



                                OB Delivery                     



                                                                



                                Pre-delivery                    



                                GBS status:                     



                                 GBS status: negative                 



                                Admission EGA:                  



                                 Weeks: 40                      



                                 Days: 1                        



                                EGA at delivery (wks/days): 40 weeks (40w2d)    

             



                                Admission indication:                 



                                PROM                            



                                                                



                                                                



                                Baby A Information                 



                                Baby A information                 



                                 Delivery date: 10/02/21                 



                                 Birth status: live born                 



                                 Wt of baby (grams): 3740                 



                                 Wt of baby (lbs/oz): [id number]                 



                                 Gender: male (Sergio)                 



                                 APGAR 1 minute: 5                 



                                 APGAR 5 minutes: 9                 



                                 Presentation: vertex                 



                                ABG details Baby A                 



                                 Cord blood gases: collected                 



                                Nuchal cord Baby A                 



                                  Nuchal cord: no                 



                                                                



                                 Delivery                 



                                 section                 



                                  indication: maternal exhaustion, impen

ding arrest of descent                 



                                 Priority: indicated (add on)                 



                                 Antibiotic prior to incision: 1 dose           

      



                                 )(SCDs applied activated: Yes                 



                                 Uterine incision: low transverse               

  



                                 Consent: indication discussed, questions answer

ed, pt consent to op delivery                 



                                 Mother's condition: mother stable              

   



                                 Infant's condition: infant stable in room      

           



                                Additional comments:                 



                                                    After informed consent was o

btained, the patient was taken to the operating 

room                                                



                                                    where anesthesia was adminis

tered and found to be adequate. She was then 

prepped                                             



                                                    and draped in sterile fashio

n in a dorsal supine position with a leftward tilt.

                                                    



                                A time out procedure was then performed and the 

OR team agreed to the planned                 



                                procedure. The patient s abdomen was then tested

 with an Allis clamp and                 



                                anesthesia was found to be adequate.            

     



                                                                



                                                    A Pfannenstiel skin incision

 was then made with the scalpel and carried down to

                                                    



                                the underlying fascia with the bovie. The bovie 

was then used to score the                 



                                fascia in the midline and the incision was then 

extended laterally using Louie                 



                                scissors. The superior aspect of the fascial inc

ision was then grasped with                 



                                Kocher clamps, elevated and rectus muscles were 

dissected off. The inferior                 



                                                    aspect of the fascial incisi

on was then grasped with Kocher clamps, elevated 

and                                                 



                                rectus muscles were dissected off.              

   



                                                                



                                The rectus muscles were then  in the mi

dline and the peritoneum                 



                                identified in a clear area and entered bluntly. 

The peritoneum was then                 



                                extended superior and inferiorly with good visua

lization of the bladder.                 



                                                                



                                        The bladder blade was then inserted and 

the uterus was incised in a transverse 

                                        



                                fashion with the scalpel. The incision was then 

extended laterally using the                 



                                Feng method.                    



                                                                



                                The infant deeply engaged in the maternal pelvis

, occiput posterior and                 



                                acynclitic. Head was able to be disengaged with 

the assistance of a vaginal                 



                                                    hand. Prolonged extraction o

f infant was encountered due to deep arrest. 

However                                             



                                infant was delivered in cephalic presentation at

raumatically, nose and mouth                 



                                        were suctioned, cord was clamped and cut

, and infant was handed off to waiting 

                                        



                                nurse. Nuchal cord was not noted. Cord blood dung ramos was collected.                 



                                                                



                                        The placenta was then removed manually a

nd the uterus was exteriorized and was 

                                        



                                cleared of all clots and debris with a moist lap

 sponge. The hysterotomy was                 



                                                    then repaired using 0 Monocr

yl in a running locked fashion with good hemostasis

                                                    



                                noted. The uterus was then returned to the abdom

en. The pelvic colic gutters                 



                                were then cleared off all clots and debris using

 a moist lap sponge. The                 



                                hysterotomy and bladder were then reinspected an

d found to be hemostatic. The                 



                                uterus was noted to be atonic, requiring both Me

thergine and Hemabate, 1 dose                 



                                each, and uterine massage. Atony resolved with t

hese interventions.                 



                                                                



                                                    The rectus muscles and perit

oneum were then reapproximated in the midline using

                                                    



                                2.0 Vicryl in a mattress suture fashion. The mus

baldomero and fascia were then                 



                                                    examined and found to be hem

ostatic. The fascia was then reapproximated using 0

                                                    



                                Vicryl in a running fashion. The subcutaneous ti

ssue was then irrigated and                 



                                bovie cautery was used to obtain hemostasis. The

 subcutaneous tissue was then                 



                                        reapproximated using 2.0 Plain gut. The 

skin was then closed with 3.0 Monocryl 

                                        



                                in a subcuticular fashion.                 



                                                                



                                The patient tolerated the procedure well. She wa

s then taken to recovery room                 



                                in stable condition. Sponge, lap and needle coun

ts were correct times 3. The                 



                                radiofrequency scan was performed for retained s

ponges and was negative. 2                 



                                grams of ancef were given prior to the start of 

surgery for surgical                 



                                prophylaxis.                    



                                                                



                                                                



                                Operative Note-Full                 



                                )(Start date: 10/02/21                 



                                )(Pre-procedure diagnosis:                 



                                Maternal exhaustion, impending arrest of second 

stage, PROM                 



                                )(Post-procedure diagnosis: same as pre-procedur

e dx                 



                                )(Procedures performed:                 



                                primary low transverse                 

 



                                )(Primary Surgeon: Re Johnson MD           

      



                                )(Assistant(s): SARWAT Mckeon                

 



                                )(Anesthesia: epidural anesthetic               

  



                                )(Operative findings:                 



                                Viable male infant in vertex presentation. Clear

 amniotic fluid. Normal                 



                                appearing uterus, tubes and ovaries             

    



                                )(Complications: none                 



                                )( Estimated blood loss (ml): 800               

  



                                )(Specimens removed/altered: placenta           

      



                                Cultures sent: No                 



                                Drain(s)/tube(s): alegria                 



                                )(Implant(s): none                 



                                Fluids:                         



                                1200cc                          



                                Urine output:                   



                                200cc                           



                                Disposition: PACU                 



                                Counts:                         



                                 Sponge count: correct                 



                                 Instrument count: correct                 



                                 Needle count: correct                 



                                 Cottonoid count: correct                 



                                Wound class: clean-contaminated                 



                                                                



                                Blood Loss/Details                 



                                Blood loss at delivery: <1K: no sx hypovol=no he

m                 



                                Cause of bleeding: uterine atony w/o hemorr     

            



                                Management: uterotonic agent(s), fundal massage 

                



                                EBL at delivery (ml's): 800                 



                                                                



                                Electronically Signed by Re Johnson MD on 10

/02/21 at 0634                 



                                                                



                                RPT #:[id number]                 



                                ***END OF REPORT***                 



                                                                



                                                                

 

                2021-10-02 04:31:00-00:00                                 [facility] ([facility])               

  



                                Clinical Note                   



                                REPORT#:[id number] REPORT STATUS: Signed         

        



                                DATE:10/02/21 TIME: 431                 



                                                                



                                PATIENT: CHINA BARRY  UNIT #: [id number]   

              



                                ACCOUNT#: [id number] ROOM/BED: [address]        

         



                                : [birth year] AGE: 21 SEX: F ATTEND: Maral Muse MD                 



                                ADM DT: 10/01/21 AUTHOR: Re Johnson MD      

           



                                                                



                                * ALL edits or amendments must be made on the [employer]/computer document *                 



                                                                



                                                                



                                Clinical Note                   



                                Note:                           



                                Decision for                  



                                                                



                                Pt has now been complete and pushing for approxi

mately 2.5 hours. Pt using                 



                                        maximum expulsive effort. No progress ha

s been noted during this time. However 

                                        



                                                    pt has now developed signifi

cant vaginal/vulvar edema and worsening caput. 

Fetal                                               



                                                    head felt to be in OP positi

on, with station at 0 to +1. Given high station and

                                                    



                                lack of descent, operative delivery not indicate

d.                 



                                                                



                                Pt has epidural in place, however has not been w

orking for the past hour. Pt                 



                                        complains of severe lower abdominal pain

. Also notes that she feels exhausted. 

                                        



                                                                



                                Discussed above exam findings with pt and partne

r. Counseled that, as a                 



                                primipara with an epidural, she could push for u

p to 4 hours prior to being                 



                                                    diagnosed with arrest of shady

cent. However worsening caput and poor progress may

                                                    



                                                    indicate possible impending 

arrest. Pt states she no longer desires to push, 

and                                                 



                                would prefer to proceed with .         

        



                                                                



                                Counseled on risk of bleeding, infection, damage

 to surrounding structures,                 



                                                    fetal injury. Pt voices unde

rstanding and desires to proceed. Charge nurse made

                                                    



                                aware.                          



                                                                



                                Electronically Signed by Re Johnson MD on 10

/02/21 at 0438                 



                                                                



                                RPT #:[id number]                 



                                ***END OF REPORT***                 



                                                                



                                                                

 

                2021-10-01 11:46:00-00:00                                 [facility] ([facility])               

  



                                OB Admission / H P                 



                                REPORT#:[id number] REPORT STATUS: Signed         

        



                                DATE:10/01/21 TIME: 1146                 



                                                                



                                PATIENT: CHINA BARRY UNIT #: [id number]    

             



                                ACCOUNT#: [id number] ROOM/BED: [facility]        

         



                                : [birth year] AGE: 21 SEX: F ATTEND: Maral Muse MD                 



                                ADM DT: 10/01/21 AUTHOR: Lisa Salomon MD     

            



                                                                



                                * ALL edits or amendments must be made on the el

[employer]/computer document *                 



                                                                



                                                                



                                OB History                      



                                Chief complaint: suspected ruptured memb        

         



                                HPI:                            



                                Patient is a 20yo  who [facility]

[facility] complaints of leakage of                 



                                clear fluid that began this morning at 0545 whic

h has been persisent. She has                 



                                occasional contractions and reports good fetal m

ovement. She reports no other                 



                                questions or concerns.                 



                                                                



                                        [place] care at [facility] with Dr. Muse. Pregnancy has been 

                                        



                                uncomplicated                   



                                Pregnancy history:                 



                                 : 1                     



                                 Term: 0                        



                                 : 0                     



                                 Abortus: 0                     



                                 Living children: 0                 



                                Current pregnancy:                 



                                 Admission EGA (weeks) 40                 



                                 Admission EGA (days) 1                 



                                Labs:                           



                                 Blood type: O                  



                                 Rh: positive                   



                                  Rubella: immune                 



                                 Hepatitis B: negative                 



                                 HIV: negative                  



                                 STD: negative                  



                                 Syphilis: currently negative                 



                                 GBS: negative                  



                                Procedures: nuchal translucency scan            

     



                                Genetic testing: none                 



                                                                



                                Past History                    



                                Additional Medical History:                 



                                migraine headaches, anxiety/depression          

       



                                Additional Surgical History:                 



                                tonsillectomy, cholecystectomy                 



                                Additional Family History                 



                                hypercholesterolemia                 



                                Alcohol Use Denies EtOH use                 



                                Drug Use Denies recreational drugs              

   



                                Smoking status:                 



                                 Smoking status for patients 13 years old or old

er: Never Smoker                 



                                Medications:                    



                                Home Medications:                 



                                PNV WITH CA/IRON/FA/DHA (PRENATE ESSENTIAL) 1 CA

P PO DAILY                 



                                                                



                                                                



                                Allergies:                      



                                Coded Allergies:                 



                                No Known Allergies (21)                 



                                                                



                                                                



                                Review of Systems                 



                                Constitutional:                 



                                Denies: chills, fever.                 



                                Respiratory:                    



                                Denies: SOB.                    



                                Cardiovascular:                 



                                Denies: chest pain, palpitations.               

  



                                GI:                             



                                Denies: abdominal pain, diarrhea, nausea, vomiti

ng.                 



                                :                             



                                Reports: pregnant. Denies: dysuria, previous pre

gnancies, vaginal bleeding.                 



                                Neuro:                          



                                Denies: dizziness, headache, lightheaded, vision

 change.                 



                                                                



                                Objective                       



                                                                



                                General                         



                                VS:                             



                                Last Documented:                 



                                 Result Date Time                 



                                 B/P Mean 93.0 10/01 1013                 



                                 B/P 124/74 10 1013                 



                                 Pulse 81 10/01 1013                 



                                                                



                                Vital Signs                     



                                 Date Temp Pulse Resp B/P B/P Mean Pulse Ox FiO2

                 



                                 10/01 81 124/ 93.0                 



                                                                



                                PATIENT WEIGHT:                 



                                                                



                                Weight (lb): 176                 



                                Weight (oz):                    



                                Weight (kg): 79.800800                 



                                                                



                                                                



                                Physical Exam                   



                                HEENT: normocephalic w/o injury, no scleral icte

luigi                 



                                Cardiac: regular rate and rhythm                

 



                                Lungs: clear to auscultation, unlabored breathin

g                 



                                Breasts: deferred                 



                                Neuro:                          



                                 Exam: alert, oriented x3, normal speech, CNII-X

II grossly intact                 



                                Abdomen: gravid, soft, no abnormal tenderness   

              



                                Uterine activity:                 



                                 Monitor: toco                  



                                 Frequency (description): irritability          

       



                                Pelvic exam:                    



                                 Pelvis clinically adequate: yes                

 



                                Cervical/Fetal exam:                 



                                 Dilatation (cm): 1 (per RN)                 



                                 Effacement (%): 30                 



                                 Fetal station: - 3                 



                                 Fetal presentation: cephalic                 



                                Membranes:                      



                                 Membranes: PROM                 



                                 ROM date: 10/01/21                 



                                 ROM time: 0545                 



                                 Amniotic fluid: clear                 



                                Lower extremities:                 



                                 Edema: trace                   



                                 Calf tenderness: negative                 



                                Baby A:                         



                                 Baby A baseline: 135 bpm                 



                                 Baby A variability: moderate 6-25 bpm          

       



                                 Baby A accelerations: 15 X 15                 



                                 Baby A decelerations: none                 



                                 Baby A FHR category: category 1                

 



                                                                



                                Result                          



                                Findings/Data:                  



                                Laboratory Tests:                 



                                 10/01 10/01                    



                                 0910 0910                      



                                 Hematology                     



                                  WBC (6.5 - 12.3 K/mm3) 10.1                 



                                 RBC (3.51 - 4.69 M/mm3) 3.83                 



                                 Hgb (10.1 - 13.8 g/dL)  12.0                 



                                 Hct (32.5 - 41.8 %) 36.3                 



                                 MCV (84.6 - 96.6 fL) 94.8                 



                                 MCH (27.3 - 33.9 pg)  31.3                 



                                 MCHC (32.0 - 34.2 gm/dL) 33.1                 



                                 RDW (12.2 - 16.3 %) 15.2                 



                                  Plt Count (134 - 363 K/mm3) 157               

  



                                 MPV (9.2 - 12.7 fL) 11.9                 



                                 Neut % (Auto) (57.9 - 77.3 %)  77.9 H          

       



                                 Lymph % (Auto) (14.5 - 29.7 %) 12.3 L          

       



                                 Mono % (Auto) (3.6 - 10.2 %) 8.0               

  



                                 Eos % (Auto) (0.0 - 3.0 %) 0.5                 



                                 Baso % (Auto) (0.1 - 0.9 %) 0.4                

 



                                 Neut # (Auto) (K/mm3) 7.9                 



                                  Lymph # (Auto) (K/mm3) 1.3                 



                                 Mono # (Auto) (K/mm3) 0.8                 



                                 Eos # (Auto) (K/mm3)  0.05                 



                                 Baso # (Auto) (K/mm3) 0.0                 



                                 Immature Plt Fraction (0.0 - 10.8 %) 8.6       

          



                                 Serology                       



                                  Treponema pallidum Ab (NONREACTIVE) NONREACTIV

E                 



                                 Hep Bs Antigen (NONREACTIVE) NONREACTIVE       

          



                                 Hepatitis C Antibody (NONREACTIVE) NONREACTIVE 

                



                                 Hep C Ab Signal/Cutoff (<0.80) <0.02           

      



                                 HIV 1 2 Antibody (NONREACTIVE) NONREACTIVE     

            



                                 SARS-CoV-2 Ag (Rapid) (NEGATIVE) NEGATIVE      

           



                                                                



                                                                



                                Results: labs reviewed, vital signs stable      

           



                                ROM test: positive                 



                                                                



                                                                



                                Diagnosis, Assessment Plan                 



                                                                



                                Diagnosis, Assessment Plan                 



                                Free Text A P:                  



                                                    20yo  @40+1wga admitted 

for medically indicated induction of labor for PROM

                                                    



                                1. Admit to L D, routine admit orders/labs, COVI

D negative, Hgb 12                 



                                2. Labor: ripening w/ cervidil x12hrs, followed 

by pitocin per protocol 2x2                 



                                                    3. MWB: reassuring, Stadol/E

pidural prn, pelvis clinically adequate for vaginal

                                                    



                                delivery                        



                                4. FWB: category 1, CEFM/TOCO; It's a Boy! "Snehal

an"; EFW 3300g                 



                                5. PNL: RH+, RI/VI, GBS negative                

 



                                6. PMH/PSH: migraine headaches, anxiety/depressi

on (no meds)/cholecystectomy,                 



                                tonsillectomy                   



                                7. All: NKDA                    



                                                                



                                Dispo: Admit to L D for mIOL, anticipate     

             



                                Assessment/Impression: PROM 39 plus weeks       

          



                                Plan: admit to inpatient,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